# Patient Record
Sex: FEMALE | Race: WHITE | NOT HISPANIC OR LATINO | Employment: FULL TIME | URBAN - NONMETROPOLITAN AREA
[De-identification: names, ages, dates, MRNs, and addresses within clinical notes are randomized per-mention and may not be internally consistent; named-entity substitution may affect disease eponyms.]

---

## 2022-03-25 ENCOUNTER — TRANSFERRED RECORDS (OUTPATIENT)
Dept: HEALTH INFORMATION MANAGEMENT | Facility: HOSPITAL | Age: 62
End: 2022-03-25

## 2022-03-25 LAB
CHOLESTEROL (EXTERNAL): 169 MG/DL (ref 114–200)
HDLC SERPL-MCNC: 52 MG/DL (ref 40–60)
HEP C HIM: NORMAL
HIV 1&2 EXT: NORMAL
HPV ABSTRACT: NORMAL
LDL CHOLESTEROL CALCULATED (EXTERNAL): 101 MG/DL
PAP-ABSTRACT: NORMAL
TRIGLYCERIDES (EXTERNAL): 80 MG/DL (ref 10–200)

## 2022-07-11 ENCOUNTER — TRANSFERRED RECORDS (OUTPATIENT)
Dept: HEALTH INFORMATION MANAGEMENT | Facility: CLINIC | Age: 62
End: 2022-07-11

## 2023-09-13 ENCOUNTER — OFFICE VISIT (OUTPATIENT)
Dept: FAMILY MEDICINE | Facility: OTHER | Age: 63
End: 2023-09-13
Attending: STUDENT IN AN ORGANIZED HEALTH CARE EDUCATION/TRAINING PROGRAM
Payer: COMMERCIAL

## 2023-09-13 VITALS
HEIGHT: 60 IN | OXYGEN SATURATION: 100 % | WEIGHT: 128.5 LBS | SYSTOLIC BLOOD PRESSURE: 110 MMHG | HEART RATE: 80 BPM | TEMPERATURE: 98.2 F | BODY MASS INDEX: 25.23 KG/M2 | DIASTOLIC BLOOD PRESSURE: 66 MMHG

## 2023-09-13 DIAGNOSIS — F33.42 RECURRENT MAJOR DEPRESSIVE DISORDER, IN FULL REMISSION (H): ICD-10-CM

## 2023-09-13 DIAGNOSIS — Z12.31 BREAST CANCER SCREENING BY MAMMOGRAM: ICD-10-CM

## 2023-09-13 DIAGNOSIS — Z76.89 ENCOUNTER TO ESTABLISH CARE: Primary | ICD-10-CM

## 2023-09-13 PROBLEM — N95.2 ATROPHIC VAGINITIS: Status: ACTIVE | Noted: 2022-03-28

## 2023-09-13 PROCEDURE — 99203 OFFICE O/P NEW LOW 30 MIN: CPT | Performed by: STUDENT IN AN ORGANIZED HEALTH CARE EDUCATION/TRAINING PROGRAM

## 2023-09-13 RX ORDER — BUPROPION HYDROCHLORIDE 150 MG/1
150 TABLET ORAL EVERY MORNING
Qty: 90 TABLET | Refills: 3 | Status: SHIPPED | OUTPATIENT
Start: 2023-09-13 | End: 2024-04-02

## 2023-09-13 RX ORDER — BUPROPION HYDROCHLORIDE 150 MG/1
150 TABLET ORAL EVERY MORNING
COMMUNITY
Start: 2023-06-28 | End: 2023-09-13

## 2023-09-13 ASSESSMENT — ANXIETY QUESTIONNAIRES
2. NOT BEING ABLE TO STOP OR CONTROL WORRYING: SEVERAL DAYS
GAD7 TOTAL SCORE: 2
IF YOU CHECKED OFF ANY PROBLEMS ON THIS QUESTIONNAIRE, HOW DIFFICULT HAVE THESE PROBLEMS MADE IT FOR YOU TO DO YOUR WORK, TAKE CARE OF THINGS AT HOME, OR GET ALONG WITH OTHER PEOPLE: NOT DIFFICULT AT ALL
3. WORRYING TOO MUCH ABOUT DIFFERENT THINGS: NOT AT ALL
4. TROUBLE RELAXING: NOT AT ALL
GAD7 TOTAL SCORE: 2
5. BEING SO RESTLESS THAT IT IS HARD TO SIT STILL: NOT AT ALL
6. BECOMING EASILY ANNOYED OR IRRITABLE: NOT AT ALL
7. FEELING AFRAID AS IF SOMETHING AWFUL MIGHT HAPPEN: NOT AT ALL
1. FEELING NERVOUS, ANXIOUS, OR ON EDGE: SEVERAL DAYS

## 2023-09-13 ASSESSMENT — PAIN SCALES - GENERAL: PAINLEVEL: NO PAIN (0)

## 2023-09-13 ASSESSMENT — PATIENT HEALTH QUESTIONNAIRE - PHQ9
SUM OF ALL RESPONSES TO PHQ QUESTIONS 1-9: 5
10. IF YOU CHECKED OFF ANY PROBLEMS, HOW DIFFICULT HAVE THESE PROBLEMS MADE IT FOR YOU TO DO YOUR WORK, TAKE CARE OF THINGS AT HOME, OR GET ALONG WITH OTHER PEOPLE: NOT DIFFICULT AT ALL
SUM OF ALL RESPONSES TO PHQ QUESTIONS 1-9: 5

## 2023-09-13 NOTE — PROGRESS NOTES
Assessment & Plan     Encounter to establish care  Medical, surgical, family, and social histories discussed updated. Reviewed active healthcare problems. Medications reviewed. Reviewed care everywhere records.   Reviewed healthcare maintenance - planning full physical in six weeks.   She will check with insurance on colonoscopy vs cologuard testing.     Recurrent major depressive disorder, in full remission (H)  Stable. Doing well with Wellbutrin. Noted that depression does tend to worsen seasonally. Refilled today.   - buPROPion (WELLBUTRIN XL) 150 MG 24 hr tablet; Take 1 tablet (150 mg) by mouth every morning    Breast cancer screening by mammogram  - MA Screen Bilateral w/Mannie; Future        Kelin Coronado MD  [facility] [place]    Anna Marie Bosch is a 63 year old, presenting for the following health issues:  Depression        9/13/2023     9:17 AM   Additional Questions   Roomed by Tara Harris CMA   Accompanied by self       HPI     Establish care - lives in [place]. Moved from [facility]. She is her moms primary caregiver. Doesn't enjoy the snow. Son in high school in [place]. Works for [employer], works from home.     Has never had colonoscopy. No family history of colon cancer, melena or hematochezia, abdominal pain or change in bowel habits. No prior colon polyps.     Pap neg with neg HPV 3/25/2022  Mammogram nromal 7/11/22  Lipid 3/25/2022    Depression Followup  How are you doing with your depression since your last visit? Improved once changing  jobs, she is in much better shape  Are you having other symptoms that might be associated with depression? No  Have you had a significant life event?  OTHER: stress with being caregiver for her mother and a child that causes stress.     Are you feeling anxious or having panic attacks?   No  Do you have any concerns with your use of alcohol or other drugs? No    Feels job change was very helpful.   Winter is coming, mood  worse seasonally.     Social History     Tobacco Use    Smoking status: Never    Smokeless tobacco: Never         9/13/2023     9:13 AM   PHQ   PHQ-9 Total Score 5   Q9: Thoughts of better off dead/self-harm past 2 weeks Not at all         9/13/2023     9:14 AM   BRISEIDA-7 SCORE   Total Score 2 (minimal anxiety)   Total Score 2         9/13/2023     9:13 AM   Last PHQ-9   1.  Little interest or pleasure in doing things 0   2.  Feeling down, depressed, or hopeless 1   3.  Trouble falling or staying asleep, or sleeping too much 1   4.  Feeling tired or having little energy 1   5.  Poor appetite or overeating 0   6.  Feeling bad about yourself 1   7.  Trouble concentrating 1   8.  Moving slowly or restless 0   Q9: Thoughts of better off dead/self-harm past 2 weeks 0   PHQ-9 Total Score 5         9/13/2023     9:14 AM   BRISEIDA-7    1. Feeling nervous, anxious, or on edge 1   2. Not being able to stop or control worrying 1   3. Worrying too much about different things 0   4. Trouble relaxing 0   5. Being so restless that it is hard to sit still 0   6. Becoming easily annoyed or irritable 0   7. Feeling afraid, as if something awful might happen 0   BRISEIDA-7 Total Score 2   If you checked any problems, how difficult have they made it for you to do your work, take care of things at home, or get along with other people? Not difficult at all           Objective    /66 (BP Location: Right arm, Patient Position: Sitting, Cuff Size: Adult Regular)   Pulse 80   Temp 98.2  F (36.8  C) (Tympanic)   Ht 1.524 m (5')   Wt 58.3 kg (128 lb 8 oz)   SpO2 100%   BMI 25.10 kg/m    Body mass index is 25.1 kg/m .  Physical Exam  Constitutional:       General: She is not in acute distress.     Appearance: Normal appearance.   Cardiovascular:      Rate and Rhythm: Normal rate and regular rhythm.      Heart sounds: No murmur heard.  Pulmonary:      Effort: Pulmonary effort is normal.      Breath sounds: Normal breath sounds. No wheezing,  rhonchi or rales.   Neurological:      General: No focal deficit present.      Mental Status: She is alert and oriented to person, place, and time.   Psychiatric:         Mood and Affect: Mood normal.         Behavior: Behavior normal.

## 2023-09-13 NOTE — PATIENT INSTRUCTIONS
You are due for your mammogram. You can call the [facility] at [phone] to schedule an appointment or you can also schedule through [facility].      Cologuard (stool test) ordered.     For occasional dryness, could consider Replens (over the counter moisturizer)

## 2023-09-21 ENCOUNTER — HOSPITAL ENCOUNTER (OUTPATIENT)
Dept: MAMMOGRAPHY | Facility: OTHER | Age: 63
Discharge: HOME | End: 2023-09-21
Attending: STUDENT IN AN ORGANIZED HEALTH CARE EDUCATION/TRAINING PROGRAM
Payer: COMMERCIAL

## 2023-09-21 ENCOUNTER — ANCILLARY PROCEDURE (OUTPATIENT)
Dept: MAMMOGRAPHY | Facility: OTHER | Age: 63
End: 2023-09-21
Attending: STUDENT IN AN ORGANIZED HEALTH CARE EDUCATION/TRAINING PROGRAM
Payer: COMMERCIAL

## 2023-09-21 DIAGNOSIS — R92.8 ABNORMAL SCREENING MAMMOGRAM: ICD-10-CM

## 2023-09-21 DIAGNOSIS — Z12.31 BREAST CANCER SCREENING BY MAMMOGRAM: ICD-10-CM

## 2023-09-21 PROCEDURE — 77065 DX MAMMO INCL CAD UNI: CPT | Mod: TC | Performed by: RADIOLOGY

## 2023-09-21 PROCEDURE — 77067 SCR MAMMO BI INCL CAD: CPT | Mod: TC | Performed by: RADIOLOGY

## 2023-09-21 PROCEDURE — 77063 BREAST TOMOSYNTHESIS BI: CPT | Mod: TC | Performed by: RADIOLOGY

## 2023-09-21 PROCEDURE — G0279 TOMOSYNTHESIS, MAMMO: HCPCS | Mod: TC | Performed by: RADIOLOGY

## 2023-10-22 ENCOUNTER — HEALTH MAINTENANCE LETTER (OUTPATIENT)
Age: 63
End: 2023-10-22

## 2023-10-31 ASSESSMENT — ENCOUNTER SYMPTOMS
MYALGIAS: 0
HEMATOCHEZIA: 0
NERVOUS/ANXIOUS: 0
HEARTBURN: 0
EYE PAIN: 0
NAUSEA: 0
ABDOMINAL PAIN: 0
JOINT SWELLING: 0
FEVER: 0
DIZZINESS: 0
DIARRHEA: 0
HEADACHES: 0
WEAKNESS: 0
PALPITATIONS: 0
ARTHRALGIAS: 0
COUGH: 0
SORE THROAT: 0
HEMATURIA: 0
FREQUENCY: 0
DYSURIA: 0
PARESTHESIAS: 0
CHILLS: 0
BREAST MASS: 0
SHORTNESS OF BREATH: 0
CONSTIPATION: 0

## 2023-11-01 ENCOUNTER — OFFICE VISIT (OUTPATIENT)
Dept: FAMILY MEDICINE | Facility: OTHER | Age: 63
End: 2023-11-01
Attending: STUDENT IN AN ORGANIZED HEALTH CARE EDUCATION/TRAINING PROGRAM
Payer: COMMERCIAL

## 2023-11-01 ENCOUNTER — LAB (OUTPATIENT)
Dept: LAB | Facility: OTHER | Age: 63
End: 2023-11-01
Attending: STUDENT IN AN ORGANIZED HEALTH CARE EDUCATION/TRAINING PROGRAM
Payer: COMMERCIAL

## 2023-11-01 VITALS
RESPIRATION RATE: 17 BRPM | OXYGEN SATURATION: 96 % | BODY MASS INDEX: 25.52 KG/M2 | HEART RATE: 78 BPM | DIASTOLIC BLOOD PRESSURE: 60 MMHG | TEMPERATURE: 97 F | SYSTOLIC BLOOD PRESSURE: 108 MMHG | WEIGHT: 130 LBS | HEIGHT: 60 IN

## 2023-11-01 DIAGNOSIS — Z00.00 ROUTINE GENERAL MEDICAL EXAMINATION AT A HEALTH CARE FACILITY: ICD-10-CM

## 2023-11-01 DIAGNOSIS — Z00.00 ROUTINE GENERAL MEDICAL EXAMINATION AT A HEALTH CARE FACILITY: Primary | ICD-10-CM

## 2023-11-01 DIAGNOSIS — Z12.11 COLON CANCER SCREENING: ICD-10-CM

## 2023-11-01 LAB
ALBUMIN SERPL BCG-MCNC: 4 G/DL (ref 3.5–5.2)
ALP SERPL-CCNC: 61 U/L (ref 35–104)
ALT SERPL W P-5'-P-CCNC: 20 U/L (ref 0–50)
ANION GAP SERPL CALCULATED.3IONS-SCNC: 11 MMOL/L (ref 7–15)
AST SERPL W P-5'-P-CCNC: 25 U/L (ref 0–45)
BASOPHILS # BLD AUTO: 0.1 10E3/UL (ref 0–0.2)
BASOPHILS NFR BLD AUTO: 1 %
BILIRUB SERPL-MCNC: 0.4 MG/DL
BUN SERPL-MCNC: 12.7 MG/DL (ref 8–23)
CALCIUM SERPL-MCNC: 9.5 MG/DL (ref 8.8–10.2)
CHLORIDE SERPL-SCNC: 104 MMOL/L (ref 98–107)
CHOLEST SERPL-MCNC: 215 MG/DL
CREAT SERPL-MCNC: 0.7 MG/DL (ref 0.51–0.95)
DEPRECATED HCO3 PLAS-SCNC: 25 MMOL/L (ref 22–29)
EGFRCR SERPLBLD CKD-EPI 2021: >90 ML/MIN/1.73M2
EOSINOPHIL # BLD AUTO: 0.4 10E3/UL (ref 0–0.7)
EOSINOPHIL NFR BLD AUTO: 7 %
ERYTHROCYTE [DISTWIDTH] IN BLOOD BY AUTOMATED COUNT: 13.1 % (ref 10–15)
GLUCOSE SERPL-MCNC: 97 MG/DL (ref 70–99)
HCT VFR BLD AUTO: 40.1 % (ref 35–47)
HDLC SERPL-MCNC: 63 MG/DL
HGB BLD-MCNC: 13.4 G/DL (ref 11.7–15.7)
HOLD SPECIMEN: NORMAL
HOLD SPECIMEN: NORMAL
IMM GRANULOCYTES # BLD: 0 10E3/UL
IMM GRANULOCYTES NFR BLD: 0 %
LDLC SERPL CALC-MCNC: 137 MG/DL
LYMPHOCYTES # BLD AUTO: 2 10E3/UL (ref 0.8–5.3)
LYMPHOCYTES NFR BLD AUTO: 37 %
MCH RBC QN AUTO: 31.1 PG (ref 26.5–33)
MCHC RBC AUTO-ENTMCNC: 33.4 G/DL (ref 31.5–36.5)
MCV RBC AUTO: 93 FL (ref 78–100)
MONOCYTES # BLD AUTO: 0.9 10E3/UL (ref 0–1.3)
MONOCYTES NFR BLD AUTO: 16 %
NEUTROPHILS # BLD AUTO: 2.1 10E3/UL (ref 1.6–8.3)
NEUTROPHILS NFR BLD AUTO: 39 %
NONHDLC SERPL-MCNC: 152 MG/DL
NRBC # BLD AUTO: 0 10E3/UL
NRBC BLD AUTO-RTO: 0 /100
PLATELET # BLD AUTO: 276 10E3/UL (ref 150–450)
POTASSIUM SERPL-SCNC: 3.8 MMOL/L (ref 3.4–5.3)
PROT SERPL-MCNC: 7.1 G/DL (ref 6.4–8.3)
RBC # BLD AUTO: 4.31 10E6/UL (ref 3.8–5.2)
SODIUM SERPL-SCNC: 140 MMOL/L (ref 135–145)
TRIGL SERPL-MCNC: 73 MG/DL
WBC # BLD AUTO: 5.4 10E3/UL (ref 4–11)

## 2023-11-01 PROCEDURE — 85025 COMPLETE CBC W/AUTO DIFF WBC: CPT

## 2023-11-01 PROCEDURE — 90471 IMMUNIZATION ADMIN: CPT | Performed by: STUDENT IN AN ORGANIZED HEALTH CARE EDUCATION/TRAINING PROGRAM

## 2023-11-01 PROCEDURE — 80061 LIPID PANEL: CPT

## 2023-11-01 PROCEDURE — 90686 IIV4 VACC NO PRSV 0.5 ML IM: CPT | Performed by: STUDENT IN AN ORGANIZED HEALTH CARE EDUCATION/TRAINING PROGRAM

## 2023-11-01 PROCEDURE — 80053 COMPREHEN METABOLIC PANEL: CPT

## 2023-11-01 PROCEDURE — 36415 COLL VENOUS BLD VENIPUNCTURE: CPT

## 2023-11-01 PROCEDURE — 99396 PREV VISIT EST AGE 40-64: CPT | Mod: 25 | Performed by: STUDENT IN AN ORGANIZED HEALTH CARE EDUCATION/TRAINING PROGRAM

## 2023-11-01 ASSESSMENT — ENCOUNTER SYMPTOMS
FREQUENCY: 0
PARESTHESIAS: 0
BREAST MASS: 0
HEADACHES: 0
WEAKNESS: 0
JOINT SWELLING: 0
CHILLS: 0
ARTHRALGIAS: 0
HEMATOCHEZIA: 0
HEARTBURN: 0
EYE PAIN: 0
SORE THROAT: 0
DYSURIA: 0
SHORTNESS OF BREATH: 0
DIZZINESS: 0
MYALGIAS: 0
PALPITATIONS: 0
HEMATURIA: 0
COUGH: 0
DIARRHEA: 0
ABDOMINAL PAIN: 0
NAUSEA: 0
CONSTIPATION: 0
FEVER: 0
NERVOUS/ANXIOUS: 0

## 2023-11-01 ASSESSMENT — PAIN SCALES - GENERAL: PAINLEVEL: NO PAIN (0)

## 2023-11-01 NOTE — PROGRESS NOTES
SUBJECTIVE:   CC: Moni is an 63 year old who presents for preventive health visit.       11/1/2023     7:49 AM   Additional Questions   Roomed by AKIKO Chow   Accompanied by self       Healthy Habits:     Getting at least 3 servings of Calcium per day:  Yes    Bi-annual eye exam:  Yes    Dental care twice a year:  Yes    Sleep apnea or symptoms of sleep apnea:  None    Diet:  Carbohydrate counting    Frequency of exercise:  4-5 days/week    Duration of exercise:  30-45 minutes    Taking medications regularly:  Yes    Medication side effects:  None    Additional concerns today:  No      Healthcare Maintenance  - Mammogram: normal 9/21/2023  - PAP: Pap neg with neg HPV 3/25/2022 - no history of abnormal   - Colon cancer screening: cologuard ordered  - DEXA: nonsmoker, history of ankle fracture, mom with osteoporosis. Would like to wait till 65yo.   - Lung cancer screening: n/a  - Lipids: 3/25/2022  - covid and flu shot today  - declines RSV  - declines Tdap    Depression Followup  How are you doing with your depression since your last visit? Improved   Are you having other symptoms that might be associated with depression? No  Have you had a significant life event?  No   Are you feeling anxious or having panic attacks?   No  Do you have any concerns with your use of alcohol or other drugs? No    Social History     Tobacco Use    Smoking status: Never     Passive exposure: Never    Smokeless tobacco: Never   Vaping Use    Vaping Use: Never used         9/13/2023     9:13 AM   PHQ   PHQ-9 Total Score 5   Q9: Thoughts of better off dead/self-harm past 2 weeks Not at all         9/13/2023     9:14 AM   BRISEIDA-7 SCORE   Total Score 2 (minimal anxiety)   Total Score 2         9/13/2023     9:13 AM   Last PHQ-9   1.  Little interest or pleasure in doing things 0   2.  Feeling down, depressed, or hopeless 1   3.  Trouble falling or staying asleep, or sleeping too much 1   4.  Feeling tired or having little energy 1   5.   Poor appetite or overeating 0   6.  Feeling bad about yourself 1   7.  Trouble concentrating 1   8.  Moving slowly or restless 0   Q9: Thoughts of better off dead/self-harm past 2 weeks 0   PHQ-9 Total Score 5         9/13/2023     9:14 AM   BRISEIDA-7    1. Feeling nervous, anxious, or on edge 1   2. Not being able to stop or control worrying 1   3. Worrying too much about different things 0   4. Trouble relaxing 0   5. Being so restless that it is hard to sit still 0   6. Becoming easily annoyed or irritable 0   7. Feeling afraid, as if something awful might happen 0   BRISEIDA-7 Total Score 2   If you checked any problems, how difficult have they made it for you to do your work, take care of things at home, or get along with other people? Not difficult at all   6}  Have you ever done Advance Care Planning? (For example, a Health Directive, POLST, or a discussion with a medical provider or your loved ones about your wishes): No, advance care planning information given to patient to review.  Patient declined advance care planning discussion at this time.    Social History     Tobacco Use    Smoking status: Never     Passive exposure: Never    Smokeless tobacco: Never   Substance Use Topics    Alcohol use: Not on file           10/31/2023    10:32 AM   Alcohol Use   Prescreen: >3 drinks/day or >7 drinks/week? No     Reviewed orders with patient.  Reviewed health maintenance and updated orders accordingly - Yes      Breast Cancer Screening:        10/31/2023    10:33 AM   Breast CA Risk Assessment (FHS-7)   Do you have a family history of breast, colon, or ovarian cancer? No / Unknown       Pertinent mammograms are reviewed under the imaging tab.    History of abnormal Pap smear: NO - age 30-65 PAP every 5 years with negative HPV co-testing recommended     Reviewed and updated as needed this visit by clinical staff   Tobacco  Allergies  Meds  Problems  Med Hx  Surg Hx  Fam Hx          Reviewed and updated as needed this  visit by Provider   Tobacco  Allergies  Meds  Problems  Med Hx  Surg Hx  Fam Hx             Review of Systems   Constitutional:  Negative for chills and fever.   HENT:  Negative for congestion, ear pain, hearing loss and sore throat.    Eyes:  Negative for pain and visual disturbance.   Respiratory:  Negative for cough and shortness of breath.    Cardiovascular:  Negative for chest pain, palpitations and peripheral edema.   Gastrointestinal:  Negative for abdominal pain, constipation, diarrhea, heartburn, hematochezia and nausea.   Breasts:  Negative for tenderness, breast mass and discharge.   Genitourinary:  Negative for dysuria, frequency, genital sores, hematuria, pelvic pain, urgency, vaginal bleeding and vaginal discharge.   Musculoskeletal:  Negative for arthralgias, joint swelling and myalgias.   Skin:  Negative for rash.   Neurological:  Negative for dizziness, weakness, headaches and paresthesias.   Psychiatric/Behavioral:  Negative for mood changes. The patient is not nervous/anxious.         OBJECTIVE:   /60 (BP Location: Right arm, Patient Position: Sitting, Cuff Size: Adult Regular)   Pulse 78   Temp 97  F (36.1  C) (Tympanic)   Resp 17   Ht 1.524 m (5')   Wt 59 kg (130 lb)   SpO2 96%   BMI 25.39 kg/m    Physical Exam  Constitutional:       General: She is not in acute distress.     Appearance: Normal appearance. She is well-developed. She is not ill-appearing.   HENT:      Head: Normocephalic and atraumatic.      Right Ear: Tympanic membrane and external ear normal.      Left Ear: Tympanic membrane and external ear normal.      Nose: Nose normal.      Mouth/Throat:      Mouth: Mucous membranes are moist.      Pharynx: No oropharyngeal exudate.   Eyes:      Extraocular Movements: Extraocular movements intact.      Conjunctiva/sclera: Conjunctivae normal.   Neck:      Thyroid: No thyroid mass, thyromegaly or thyroid tenderness.   Cardiovascular:      Rate and Rhythm: Normal rate and  regular rhythm.      Pulses: Normal pulses.      Heart sounds: Normal heart sounds, S1 normal and S2 normal. No murmur heard.  Pulmonary:      Effort: Pulmonary effort is normal. No respiratory distress.      Breath sounds: Normal breath sounds. No wheezing, rhonchi or rales.   Chest:   Breasts:     Right: Normal. No inverted nipple or mass.      Left: Normal. No inverted nipple or mass.   Abdominal:      General: Bowel sounds are normal. There is no abdominal bruit.      Palpations: Abdomen is soft. There is no mass or pulsatile mass.      Tenderness: There is no abdominal tenderness.   Musculoskeletal:         General: Normal range of motion.      Cervical back: Neck supple.      Right lower leg: No edema.      Left lower leg: No edema.   Lymphadenopathy:      Cervical: No cervical adenopathy.      Upper Body:      Right upper body: No supraclavicular or axillary adenopathy.      Left upper body: No supraclavicular or axillary adenopathy.   Skin:     General: Skin is warm and dry.      Capillary Refill: Capillary refill takes less than 2 seconds.      Findings: No rash.   Neurological:      Mental Status: She is alert and oriented to person, place, and time.      Gait: Gait is intact.   Psychiatric:         Attention and Perception: Attention normal.         Mood and Affect: Mood normal.         Speech: Speech normal.         Behavior: Behavior normal.         Thought Content: Thought content normal.         Cognition and Memory: Cognition normal.         Judgment: Judgment normal.         Results for orders placed or performed in visit on 11/01/23   Extra Tube     Status: None (In process)    Narrative    The following orders were created for panel order Extra Tube.  Procedure                               Abnormality         Status                     ---------                               -----------         ------                     Extra Green Top (Lithium...[[id number]]                      In process                  Extra Purple Top Tube[[id number]]                            In process                   Please view results for these tests on the individual orders.   Comprehensive metabolic panel     Status: Normal   Result Value Ref Range    Sodium 140 135 - 145 mmol/L    Potassium 3.8 3.4 - 5.3 mmol/L    Carbon Dioxide (CO2) 25 22 - 29 mmol/L    Anion Gap 11 7 - 15 mmol/L    Urea Nitrogen 12.7 8.0 - 23.0 mg/dL    Creatinine 0.70 0.51 - 0.95 mg/dL    GFR Estimate >90 >60 mL/min/1.73m2    Calcium 9.5 8.8 - 10.2 mg/dL    Chloride 104 98 - 107 mmol/L    Glucose 97 70 - 99 mg/dL    Alkaline Phosphatase 61 35 - 104 U/L    AST 25 0 - 45 U/L    ALT 20 0 - 50 U/L    Protein Total 7.1 6.4 - 8.3 g/dL    Albumin 4.0 3.5 - 5.2 g/dL    Bilirubin Total 0.4 <=1.2 mg/dL   Lipid Profile     Status: Abnormal   Result Value Ref Range    Cholesterol 215 (H) <200 mg/dL    Triglycerides 73 <150 mg/dL    Direct Measure HDL 63 >=50 mg/dL    LDL Cholesterol Calculated 137 (H) <=100 mg/dL    Non HDL Cholesterol 152 (H) <130 mg/dL    Narrative    Cholesterol  Desirable:  <200 mg/dL    Triglycerides  Normal:  Less than 150 mg/dL  Borderline High:  150-199 mg/dL  High:  200-499 mg/dL  Very High:  Greater than or equal to 500 mg/dL    Direct Measure HDL  Female:  Greater than or equal to 50 mg/dL   Male:  Greater than or equal to 40 mg/dL    LDL Cholesterol  Desirable:  <100mg/dL  Above Desirable:  100-129 mg/dL   Borderline High:  130-159 mg/dL   High:  160-189 mg/dL   Very High:  >= 190 mg/dL    Non HDL Cholesterol  Desirable:  130 mg/dL  Above Desirable:  130-159 mg/dL  Borderline High:  160-189 mg/dL  High:  190-219 mg/dL  Very High:  Greater than or equal to 220 mg/dL   CBC with platelets and differential     Status: None   Result Value Ref Range    WBC Count 5.4 4.0 - 11.0 10e3/uL    RBC Count 4.31 3.80 - 5.20 10e6/uL    Hemoglobin 13.4 11.7 - 15.7 g/dL    Hematocrit 40.1 35.0 - 47.0 %    MCV 93 78 - 100 fL    MCH 31.1 26.5 - 33.0 pg    MCHC  33.4 31.5 - 36.5 g/dL    RDW 13.1 10.0 - 15.0 %    Platelet Count 276 150 - 450 10e3/uL    % Neutrophils 39 %    % Lymphocytes 37 %    % Monocytes 16 %    % Eosinophils 7 %    % Basophils 1 %    % Immature Granulocytes 0 %    NRBCs per 100 WBC 0 <1 /100    Absolute Neutrophils 2.1 1.6 - 8.3 10e3/uL    Absolute Lymphocytes 2.0 0.8 - 5.3 10e3/uL    Absolute Monocytes 0.9 0.0 - 1.3 10e3/uL    Absolute Eosinophils 0.4 0.0 - 0.7 10e3/uL    Absolute Basophils 0.1 0.0 - 0.2 10e3/uL    Absolute Immature Granulocytes 0.0 <=0.4 10e3/uL    Absolute NRBCs 0.0 10e3/uL   CBC with Platelets & Differential     Status: None    Narrative    The following orders were created for panel order CBC with Platelets & Differential.  Procedure                               Abnormality         Status                     ---------                               -----------         ------                     CBC with platelets and d...[[id number]]                      Final result                 Please view results for these tests on the individual orders.         ASSESSMENT/PLAN:   Routine general medical examination at a health care facility  Healthcare Maintenance  - Mammogram: normal 9/21/2023  - PAP: Pap neg with neg HPV 3/25/2022 - no history of abnormal   - Colon cancer screening: cologuard ordered  - DEXA: nonsmoker, history of ankle fracture, mom with osteoporosis. Would like to wait till 67yo.   - Lung cancer screening: n/a  - Lipids: 3/25/2022  - covid and flu shot today  - declines RSV  - declines Tdap    - CBC with Platelets & Differential; Future  - Comprehensive metabolic panel; Future  - Lipid Profile; Future  - INFLUENZA VACCINE >6 MONTHS (AFLURIA/FLUZONE)    Colon cancer screening  Based on patient's low risk status (negative family history, no symptoms, and no prior high risk polyps) discussed benefits and risks of screening options including cologuard testing and colonoscopy. With shared decision making, patient elected to  pursue cologuard. Order placed. Reviewed if positive, it would still be considered a screening colonoscopy.   - COLOGUAELANA([employer])      Patient has been advised of split billing requirements and indicates understanding: No      COUNSELING:  Reviewed preventive health counseling, as reflected in patient instructions       Regular exercise       Healthy diet/nutrition       Osteoporosis prevention/bone health      BMI:   Estimated body mass index is 25.39 kg/m  as calculated from the following:    Height as of this encounter: 1.524 m (5').    Weight as of this encounter: 59 kg (130 lb).   Weight management plan: Discussed healthy diet and exercise guidelines      She reports that she has never smoked. She has never been exposed to tobacco smoke. She has never used smokeless tobacco.        Kelin Coronado MD  [facility] [place]

## 2023-11-10 ENCOUNTER — ALLIED HEALTH/NURSE VISIT (OUTPATIENT)
Dept: FAMILY MEDICINE | Facility: OTHER | Age: 63
End: 2023-11-10
Attending: STUDENT IN AN ORGANIZED HEALTH CARE EDUCATION/TRAINING PROGRAM
Payer: COMMERCIAL

## 2023-11-10 DIAGNOSIS — Z23 HIGH PRIORITY FOR 2019-NCOV VACCINE: Primary | ICD-10-CM

## 2023-11-10 PROCEDURE — 91320 SARSCV2 VAC 30MCG TRS-SUC IM: CPT

## 2023-11-10 PROCEDURE — 90480 ADMN SARSCOV2 VAC 1/ONLY CMP: CPT

## 2023-12-06 LAB — NONINV COLON CA DNA+OCC BLD SCRN STL QL: NEGATIVE

## 2024-03-13 DIAGNOSIS — F33.42 RECURRENT MAJOR DEPRESSIVE DISORDER, IN FULL REMISSION (H): ICD-10-CM

## 2024-03-13 RX ORDER — BUPROPION HYDROCHLORIDE 150 MG/1
150 TABLET ORAL EVERY MORNING
Qty: 90 TABLET | Refills: 0 | OUTPATIENT
Start: 2024-03-13

## 2024-04-02 DIAGNOSIS — F33.42 RECURRENT MAJOR DEPRESSIVE DISORDER, IN FULL REMISSION (H): ICD-10-CM

## 2024-04-02 NOTE — TELEPHONE ENCOUNTER
Wellbutrin      Last Written Prescription Date:  09/13/23  Last Fill Quantity: 90,   # refills: 3  Last Office Visit: 11/1/23  Future Office visit:

## 2024-04-03 RX ORDER — BUPROPION HYDROCHLORIDE 150 MG/1
150 TABLET ORAL EVERY MORNING
Qty: 90 TABLET | Refills: 3 | Status: SHIPPED | OUTPATIENT
Start: 2024-04-03

## 2024-04-19 ENCOUNTER — ANCILLARY PROCEDURE (OUTPATIENT)
Dept: GENERAL RADIOLOGY | Facility: OTHER | Age: 64
End: 2024-04-19
Attending: STUDENT IN AN ORGANIZED HEALTH CARE EDUCATION/TRAINING PROGRAM
Payer: COMMERCIAL

## 2024-04-19 ENCOUNTER — OFFICE VISIT (OUTPATIENT)
Dept: FAMILY MEDICINE | Facility: OTHER | Age: 64
End: 2024-04-19
Attending: STUDENT IN AN ORGANIZED HEALTH CARE EDUCATION/TRAINING PROGRAM
Payer: COMMERCIAL

## 2024-04-19 ENCOUNTER — TELEPHONE (OUTPATIENT)
Dept: FAMILY MEDICINE | Facility: OTHER | Age: 64
End: 2024-04-19

## 2024-04-19 VITALS
HEART RATE: 84 BPM | DIASTOLIC BLOOD PRESSURE: 58 MMHG | RESPIRATION RATE: 16 BRPM | SYSTOLIC BLOOD PRESSURE: 106 MMHG | BODY MASS INDEX: 23.87 KG/M2 | TEMPERATURE: 97.2 F | HEIGHT: 59 IN | OXYGEN SATURATION: 99 % | WEIGHT: 118.4 LBS

## 2024-04-19 DIAGNOSIS — M62.830 BACK MUSCLE SPASM: ICD-10-CM

## 2024-04-19 DIAGNOSIS — M54.50 ACUTE RIGHT-SIDED LOW BACK PAIN WITHOUT SCIATICA: ICD-10-CM

## 2024-04-19 DIAGNOSIS — M53.3 SACROILIAC JOINT PAIN: ICD-10-CM

## 2024-04-19 DIAGNOSIS — M54.50 ACUTE RIGHT-SIDED LOW BACK PAIN WITHOUT SCIATICA: Primary | ICD-10-CM

## 2024-04-19 LAB
ALBUMIN UR-MCNC: NEGATIVE MG/DL
APPEARANCE UR: ABNORMAL
BACTERIA #/AREA URNS HPF: ABNORMAL /HPF
BILIRUB UR QL STRIP: NEGATIVE
COLOR UR AUTO: YELLOW
GLUCOSE UR STRIP-MCNC: NEGATIVE MG/DL
HGB UR QL STRIP: NEGATIVE
KETONES UR STRIP-MCNC: 20 MG/DL
LEUKOCYTE ESTERASE UR QL STRIP: NEGATIVE
MUCOUS THREADS #/AREA URNS LPF: PRESENT /LPF
NITRATE UR QL: NEGATIVE
PH UR STRIP: 7 [PH] (ref 4.7–8)
RBC URINE: <1 /HPF
SP GR UR STRIP: 1.02 (ref 1–1.03)
SQUAMOUS EPITHELIAL: 0 /HPF
UROBILINOGEN UR STRIP-MCNC: NORMAL MG/DL
WBC URINE: 2 /HPF

## 2024-04-19 PROCEDURE — 81001 URINALYSIS AUTO W/SCOPE: CPT | Performed by: STUDENT IN AN ORGANIZED HEALTH CARE EDUCATION/TRAINING PROGRAM

## 2024-04-19 PROCEDURE — 72100 X-RAY EXAM L-S SPINE 2/3 VWS: CPT | Mod: TC | Performed by: RADIOLOGY

## 2024-04-19 PROCEDURE — 99213 OFFICE O/P EST LOW 20 MIN: CPT | Performed by: STUDENT IN AN ORGANIZED HEALTH CARE EDUCATION/TRAINING PROGRAM

## 2024-04-19 RX ORDER — IBUPROFEN 800 MG/1
800 TABLET, FILM COATED ORAL EVERY 6 HOURS PRN
COMMUNITY
Start: 2024-03-08 | End: 2024-04-23

## 2024-04-19 RX ORDER — CYCLOBENZAPRINE HCL 5 MG
5-10 TABLET ORAL 2 TIMES DAILY PRN
Qty: 20 TABLET | Refills: 0 | Status: SHIPPED | OUTPATIENT
Start: 2024-04-19 | End: 2024-06-21

## 2024-04-19 RX ORDER — OXYCODONE HYDROCHLORIDE 5 MG/1
5 TABLET ORAL EVERY 6 HOURS PRN
COMMUNITY
Start: 2024-03-08 | End: 2024-04-23

## 2024-04-19 RX ORDER — NAPROXEN 500 MG/1
500 TABLET ORAL 2 TIMES DAILY WITH MEALS
Qty: 10 TABLET | Refills: 0 | Status: SHIPPED | OUTPATIENT
Start: 2024-04-19 | End: 2024-04-24

## 2024-04-19 ASSESSMENT — ENCOUNTER SYMPTOMS: BACK PAIN: 1

## 2024-04-19 ASSESSMENT — PATIENT HEALTH QUESTIONNAIRE - PHQ9
10. IF YOU CHECKED OFF ANY PROBLEMS, HOW DIFFICULT HAVE THESE PROBLEMS MADE IT FOR YOU TO DO YOUR WORK, TAKE CARE OF THINGS AT HOME, OR GET ALONG WITH OTHER PEOPLE: SOMEWHAT DIFFICULT
SUM OF ALL RESPONSES TO PHQ QUESTIONS 1-9: 6
SUM OF ALL RESPONSES TO PHQ QUESTIONS 1-9: 6

## 2024-04-19 ASSESSMENT — PAIN SCALES - GENERAL: PAINLEVEL: SEVERE PAIN (6)

## 2024-04-19 NOTE — TELEPHONE ENCOUNTER
8:42 AM    Reason for Call: OVERBOOK    Patient is having the following symptoms: Having back spasms and can't lay down and she can't sit and hurting a lot .    The patient is requesting an appointment for today with Dr Coronado.    Was an appointment offered for this call? No  If yes : Appointment type              Date    Preferred method for responding to this message: Telephone Call  What is your phone number ? [phone]    If we cannot reach you directly, may we leave a detailed response at the number you provided? Yes    Can this message wait until your PCP/provider returns, if unavailable today? No

## 2024-04-19 NOTE — PROGRESS NOTES
Assessment & Plan     Acute right-sided low back pain without sciatica  Back muscle spasm  Sacroiliac joint pain  Suspect muscle spasm related to mechanics and change in gait after rolling her ankle over Easter.  May also have some right SI joint arthritis, causing radiating pain and spasm into her right low back.  No red flag symptoms such as unrelenting night pain, loss of bowel or bladder, or fevers. Has had good response/relief to eat and massage tactics at home.   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  Discussed option of obtaining additional x-rays of her pelvis, she would like to hold off today.  Plan to trial naproxen 500 mg twice daily for 5 days.  Stop other ibuprofen.  Will give Flexeril 5 to 10 mg to use as needed for muscle spasm.  Reviewed sedation and possible side effects.  Urgent referral to the chiropractor.  Hopefully they can fit her in on Monday.  UA was obtained today to ensure no kidney abnormality and urine was free of infection.  Follow-up in 5 days.  If symptoms persist without improvement, would obtain some lab work to rule out any other etiology, additional imaging of the pelvis/SI joint, and possible physical therapy and steroid injection of the SI joint.  - XR LUMBAR SPINE 2/3 VIEWS (Clinic Performed); Future  - UA with Microscopic reflex to Culture - [place]; Future  - UA with Microscopic reflex to Culture - [place]  - Chiropractic Referral  - naproxen (NAPROSYN) 500 MG tablet; Take 1 tablet (500 mg) by mouth 2 times daily (with meals) for 5 days Do not take any ibuprofen while taking this.  - cyclobenzaprine (FLEXERIL) 5 MG tablet; Take 1-2 tablets (5-10 mg) by mouth 2 times daily as needed for muscle spasms        Anna Marie Montenegro is a 63 year old, presenting for the following health issues:  Back Pain        4/19/2024    11:27 AM   Additional Questions   Roomed by April   Accompanied by self         4/19/2024     11:27 AM   Patient Reported Additional Medications   Patient reports taking the following new medications none     History of Present Illness       Back Pain:  She presents for follow up of back pain. Patient's back pain is a new problem.    Original cause of back pain: not sure  First noticed back pain: 1-4 weeks ago  Patient feels back pain: comes and goesLocation of back pain:  Right middle of back, right upper back and right side of waist  Description of back pain: cramping, dull ache and sharp  Back pain spreads: nowhere    Since patient first noticed back pain, pain is: gradually worsening  Does back pain interfere with her job:  Yes  On a scale of 1-10 (10 being the worst), patient describes pain as:  10  What makes back pain worse: certain positions, sitting, standing and twisting   Acupuncture: not tried  Acetaminophen: not helpful  Activity or exercise: not helpful  Chiropractor:  Not tried  Cold: not helpful  Heat: helpful  Massage: not helpful  Muscle relaxants: not tried  NSAIDS: not helpful  Opioids: helpful  Physical Therapy: not tried  Rest: helpful  Steroid Injection: not tried  Stretching: not helpful  Surgery: not tried  TENS unit: not tried  Topical pain relievers: not tried  Other healthcare providers patient is seeing for back pain: None    She eats 2-3 servings of fruits and vegetables daily.She consumes 0 sweetened beverage(s) daily.She exercises with enough effort to increase her heart rate 10 to 19 minutes per day.  She exercises with enough effort to increase her heart rate 5 days per week.   She is taking medications regularly.     Started Easter Sunday. No injury. Did roll her ankle that afternoon it started though. Was limping afterwards. Noticed back pain that night.   Right sided, sore to the touch. Can radiate into her arm.   Has tried stretching, foam roller. Helpful while she's doing it.   Ibuprofen or tylenol NOT helping. Yoga bothers it.   Spasms are horrible and make her feel  "like she's going to throw up.   Tried oxycodone to finish out day - did help   No pain down legs. Unsure if legs are weak.   Only bothers her if she moves. Can only lay flat on back with heat.   Has been working hard on weight loss this winter.   No fever.   Worse with moving around.   Ibuprofen 800mg every 6 hours for two days - did this a few days ago (Wednesday last day)  No dysuria or frequency  No unrelenting night pain  No respiratory symptoms.   Pressure on her back is amazing - very helpful.     Pain History:  When did you first notice your pain? 3 weeks   Have you seen anyone else for your pain? No  How has your pain affected your ability to work? Working full time but in pain  Where in your body do you have pain? Back Pain  Onset/Duration: 3 weeks   Description:   Location of pain: middle of back right  Character of pain: sharp, dull ache, stabbing, gnawing, burning, cramping, and intermittent  Pain radiation:  spine, and to ribs  New numbness or weakness in legs, not attributed to pain: no   Intensity: Currently 6/10  Progression of Symptoms: worsening  History:   Specific cause: none  Pain interferes with job: YES  History of back problems: no prior back problems  Any previous MRI or X-rays: None  Sees a specialist for back pain: No  Alleviating factors:   Improved by: heat and rest    Precipitating factors:  Worsened by: Standing and Sitting  Therapies tried and outcome: acetaminophen (Tylenol), heat, opioids, rest, sitting, standing, and stretch    Accompanying Signs & Symptoms:  Risk of Fracture: None  Risk of Cauda Equina: None  Risk of Infection: None  Risk of Cancer: None  Risk of Ankylosing Spondylitis: Onset at age <35, male, AND morning back stiffness  no           Objective    /58 (BP Location: Right arm, Patient Position: Sitting, Cuff Size: Adult Regular)   Pulse 84   Temp 97.2  F (36.2  C) (Tympanic)   Resp 16   Ht 1.499 m (4' 11\")   Wt 53.7 kg (118 lb 6.4 oz)   SpO2 99%   BMI " 23.91 kg/m    Body mass index is 23.91 kg/m .    Physical Exam  Constitutional:       General: She is not in acute distress.     Appearance: Normal appearance. She is not ill-appearing.   Pulmonary:      Effort: Pulmonary effort is normal.      Breath sounds: Normal breath sounds.   Abdominal:      Palpations: Abdomen is soft.   Musculoskeletal:         General: No deformity.        Arms:       Right lower leg: No edema.      Left lower leg: No edema.      Comments: No tenderness over her cervical, thoracic, or lumbar spine.  No tenderness over left SI joint.  She is tender over her right SI joint.  She has significant paraspinal muscular spasm on the right side from the low back to mid back.  This is not present on the left.  There is no swelling or warmth of the area.  She has 5/5 strength with hip flexion, leg extension, leg flexion, dorsiflexion and plantarflexion.  Patellar reflexes are 2+ bilaterally with no clonus.  Straight leg raise is negative bilaterally.  JOSEFINA testing is negative.  She does have pain with increased pressure on the right SI joint/stretching of the muscles there.   Skin:     General: Skin is warm and dry.      Capillary Refill: Capillary refill takes less than 2 seconds.      Findings: No erythema, lesion or rash.      Comments: Some scratches on her right low back, no erythema or swelling.   Neurological:      General: No focal deficit present.      Mental Status: She is alert and oriented to person, place, and time.      Motor: No weakness.      Gait: Gait normal.      Deep Tendon Reflexes: Reflexes normal.   Psychiatric:         Mood and Affect: Mood normal.         Behavior: Behavior normal.            Results for orders placed or performed in visit on 04/19/24 (from the past 24 hour(s))   UA with Microscopic reflex to Culture - [place]    Specimen: Urine, Clean Catch   Result Value Ref Range    Color Urine Yellow Colorless, Straw, Light Yellow, Yellow    Appearance Urine Slightly  Cloudy (A) Clear    Glucose Urine Negative Negative mg/dL    Bilirubin Urine Negative Negative    Ketones Urine 20 (A) Negative mg/dL    Specific Gravity Urine 1.016 1.003 - 1.035    Blood Urine Negative Negative    pH Urine 7.0 4.7 - 8.0    Protein Albumin Urine Negative Negative mg/dL    Urobilinogen Urine Normal Normal, 2.0 mg/dL    Nitrite Urine Negative Negative    Leukocyte Esterase Urine Negative Negative    Bacteria Urine Few (A) None Seen /HPF    Mucus Urine Present (A) None Seen /LPF    RBC Urine <1 <=2 /HPF    WBC Urine 2 <=5 /HPF    Squamous Epithelials Urine 0 <=1 /HPF    Narrative    Urine Culture not indicated           Signed Electronically by: Kelin Coronado MD

## 2024-04-19 NOTE — TELEPHONE ENCOUNTER
Writer called and spoke with patient. Patient is scheduled for 4/19/2024 at 1130. Patient had no further questions.

## 2024-04-23 ENCOUNTER — ANCILLARY PROCEDURE (OUTPATIENT)
Dept: GENERAL RADIOLOGY | Facility: OTHER | Age: 64
End: 2024-04-23
Attending: STUDENT IN AN ORGANIZED HEALTH CARE EDUCATION/TRAINING PROGRAM
Payer: COMMERCIAL

## 2024-04-23 ENCOUNTER — OFFICE VISIT (OUTPATIENT)
Dept: FAMILY MEDICINE | Facility: OTHER | Age: 64
End: 2024-04-23
Attending: STUDENT IN AN ORGANIZED HEALTH CARE EDUCATION/TRAINING PROGRAM
Payer: COMMERCIAL

## 2024-04-23 VITALS
DIASTOLIC BLOOD PRESSURE: 62 MMHG | BODY MASS INDEX: 24.35 KG/M2 | HEART RATE: 77 BPM | HEIGHT: 59 IN | WEIGHT: 120.8 LBS | OXYGEN SATURATION: 99 % | TEMPERATURE: 97.8 F | SYSTOLIC BLOOD PRESSURE: 104 MMHG | RESPIRATION RATE: 17 BRPM

## 2024-04-23 DIAGNOSIS — M62.830 BACK MUSCLE SPASM: ICD-10-CM

## 2024-04-23 DIAGNOSIS — M54.50 ACUTE RIGHT-SIDED LOW BACK PAIN WITHOUT SCIATICA: ICD-10-CM

## 2024-04-23 DIAGNOSIS — M53.3 SACROILIAC JOINT PAIN: ICD-10-CM

## 2024-04-23 DIAGNOSIS — M54.50 ACUTE RIGHT-SIDED LOW BACK PAIN WITHOUT SCIATICA: Primary | ICD-10-CM

## 2024-04-23 LAB
ALBUMIN SERPL BCG-MCNC: 4.2 G/DL (ref 3.5–5.2)
ALP SERPL-CCNC: 52 U/L (ref 40–150)
ALT SERPL W P-5'-P-CCNC: 15 U/L (ref 0–50)
ANION GAP SERPL CALCULATED.3IONS-SCNC: 9 MMOL/L (ref 7–15)
AST SERPL W P-5'-P-CCNC: 25 U/L (ref 0–45)
BASOPHILS # BLD AUTO: 0.1 10E3/UL (ref 0–0.2)
BASOPHILS NFR BLD AUTO: 1 %
BILIRUB SERPL-MCNC: 0.4 MG/DL
BUN SERPL-MCNC: 18.7 MG/DL (ref 8–23)
CALCIUM SERPL-MCNC: 9.5 MG/DL (ref 8.8–10.2)
CHLORIDE SERPL-SCNC: 103 MMOL/L (ref 98–107)
CREAT SERPL-MCNC: 0.8 MG/DL (ref 0.51–0.95)
CRP SERPL-MCNC: <3 MG/L
DEPRECATED HCO3 PLAS-SCNC: 24 MMOL/L (ref 22–29)
EGFRCR SERPLBLD CKD-EPI 2021: 83 ML/MIN/1.73M2
EOSINOPHIL # BLD AUTO: 0.1 10E3/UL (ref 0–0.7)
EOSINOPHIL NFR BLD AUTO: 1 %
ERYTHROCYTE [DISTWIDTH] IN BLOOD BY AUTOMATED COUNT: 13 % (ref 10–15)
GLUCOSE SERPL-MCNC: 87 MG/DL (ref 70–99)
HCT VFR BLD AUTO: 41.4 % (ref 35–47)
HGB BLD-MCNC: 14 G/DL (ref 11.7–15.7)
IMM GRANULOCYTES # BLD: 0 10E3/UL
IMM GRANULOCYTES NFR BLD: 0 %
LYMPHOCYTES # BLD AUTO: 2.4 10E3/UL (ref 0.8–5.3)
LYMPHOCYTES NFR BLD AUTO: 46 %
MCH RBC QN AUTO: 30.9 PG (ref 26.5–33)
MCHC RBC AUTO-ENTMCNC: 33.8 G/DL (ref 31.5–36.5)
MCV RBC AUTO: 91 FL (ref 78–100)
MONOCYTES # BLD AUTO: 0.6 10E3/UL (ref 0–1.3)
MONOCYTES NFR BLD AUTO: 11 %
NEUTROPHILS # BLD AUTO: 2.1 10E3/UL (ref 1.6–8.3)
NEUTROPHILS NFR BLD AUTO: 40 %
NRBC # BLD AUTO: 0 10E3/UL
NRBC BLD AUTO-RTO: 0 /100
PLATELET # BLD AUTO: 271 10E3/UL (ref 150–450)
POTASSIUM SERPL-SCNC: 4.3 MMOL/L (ref 3.4–5.3)
PROT SERPL-MCNC: 7.6 G/DL (ref 6.4–8.3)
RBC # BLD AUTO: 4.53 10E6/UL (ref 3.8–5.2)
SODIUM SERPL-SCNC: 136 MMOL/L (ref 135–145)
WBC # BLD AUTO: 5.3 10E3/UL (ref 4–11)

## 2024-04-23 PROCEDURE — 36415 COLL VENOUS BLD VENIPUNCTURE: CPT | Performed by: STUDENT IN AN ORGANIZED HEALTH CARE EDUCATION/TRAINING PROGRAM

## 2024-04-23 PROCEDURE — 96372 THER/PROPH/DIAG INJ SC/IM: CPT | Performed by: STUDENT IN AN ORGANIZED HEALTH CARE EDUCATION/TRAINING PROGRAM

## 2024-04-23 PROCEDURE — 86140 C-REACTIVE PROTEIN: CPT | Performed by: STUDENT IN AN ORGANIZED HEALTH CARE EDUCATION/TRAINING PROGRAM

## 2024-04-23 PROCEDURE — 85025 COMPLETE CBC W/AUTO DIFF WBC: CPT | Performed by: STUDENT IN AN ORGANIZED HEALTH CARE EDUCATION/TRAINING PROGRAM

## 2024-04-23 PROCEDURE — 72202 X-RAY EXAM SI JOINTS 3/> VWS: CPT | Mod: TC | Performed by: RADIOLOGY

## 2024-04-23 PROCEDURE — 99214 OFFICE O/P EST MOD 30 MIN: CPT | Mod: 25 | Performed by: STUDENT IN AN ORGANIZED HEALTH CARE EDUCATION/TRAINING PROGRAM

## 2024-04-23 PROCEDURE — 80053 COMPREHEN METABOLIC PANEL: CPT | Performed by: STUDENT IN AN ORGANIZED HEALTH CARE EDUCATION/TRAINING PROGRAM

## 2024-04-23 RX ORDER — KETOROLAC TROMETHAMINE 30 MG/ML
30 INJECTION, SOLUTION INTRAMUSCULAR; INTRAVENOUS ONCE
Status: COMPLETED | OUTPATIENT
Start: 2024-04-23 | End: 2024-04-23

## 2024-04-23 RX ORDER — PREDNISONE 20 MG/1
40 TABLET ORAL DAILY
Qty: 10 TABLET | Refills: 0 | Status: SHIPPED | OUTPATIENT
Start: 2024-04-23 | End: 2024-04-28

## 2024-04-23 RX ORDER — METHOCARBAMOL 750 MG/1
750-1500 TABLET, FILM COATED ORAL 3 TIMES DAILY PRN
Qty: 30 TABLET | Refills: 0 | Status: SHIPPED | OUTPATIENT
Start: 2024-04-23 | End: 2024-06-21

## 2024-04-23 RX ORDER — HYDROCODONE BITARTRATE AND ACETAMINOPHEN 5; 325 MG/1; MG/1
1 TABLET ORAL EVERY 6 HOURS PRN
Qty: 14 TABLET | Refills: 0 | Status: SHIPPED | OUTPATIENT
Start: 2024-04-23 | End: 2024-04-30

## 2024-04-23 RX ADMIN — KETOROLAC TROMETHAMINE 30 MG: 30 INJECTION, SOLUTION INTRAMUSCULAR; INTRAVENOUS at 09:21

## 2024-04-23 ASSESSMENT — PAIN SCALES - GENERAL: PAINLEVEL: EXTREME PAIN (8)

## 2024-04-23 NOTE — PROGRESS NOTES
Assessment & Plan     Acute right-sided low back pain without sciatica  See full note 4/19/24.   Continued pain with minimal improvement with naproxen, although is getting some relief with the muscle relaxer.  Very difficult to work with her job that requires sitting and standing, feels most comfortable when she is laying down.  Labs reassuring - normal CBC and CRP.   Suspect disc herniation versus impingement of L1-L3 nerve based on her symptoms.  No red flag symptoms at this time.  Will plan to treat with Toradol injection today, prednisone for 5 days, and change muscle relaxer to 1 that is hopefully less sedating so she can take during the day.  Norco given for severe pain.  Discussed risks of sedation, respiratory depression, tolerance, and opioid dependence  Has a chiropractor appointment tomorrow.  Urgent referral placed for physical therapy.  Follow-up in 1 week, if no improvement would get MRI.  Aware of signs or symptoms that indicate need for urgent follow-up sooner or emergency department evaluation.  She will reach out if work note is needed.   -The benefits and risks of prednisone treatment, including increased energy, increased irritability or mood changes, decreased sleep, weight changes, increased glucose and increased appetite were discussed with the patient. The patient stated understanding and agreed to proceed with treatment.  - XR Sacroiliac Joint G/E 3 Views; Future  - CBC with Platelets & Differential; Future  - Comprehensive metabolic panel; Future  - CRP inflammation; Future  - predniSONE (DELTASONE) 20 MG tablet; Take 2 tablets (40 mg) by mouth daily for 5 days Take with food, ideally in the morning.  - methocarbamol (ROBAXIN) 750 MG tablet; Take 1-2 tablets (750-1,500 mg) by mouth 3 times daily as needed for muscle spasms Do not take with flexeril. Try predominantly in AM for work.  - HYDROcodone-acetaminophen (NORCO) 5-325 MG tablet; Take 1 tablet by mouth every 6 hours as needed for  severe pain  - Physical Therapy  Referral; Future  - ketorolac (TORADOL) injection 30 mg    Back muscle spasm  Improving on exam.   - XR Sacroiliac Joint G/E 3 Views; Future  - methocarbamol (ROBAXIN) 750 MG tablet; Take 1-2 tablets (750-1,500 mg) by mouth 3 times daily as needed for muscle spasms Do not take with flexeril. Try predominantly in AM for work.  - Physical Therapy  Referral; Future  - ketorolac (TORADOL) injection 30 mg    Sacroiliac joint pain  Seems to be the issue last week, however today is  there but pain is not provoked with testing so lower suspicion that this is the issue.  Did obtain x-ray of her SI joints but minimal arthritis and read technically normal with radiology.  Will continue to closely monitor.  For her tenderness over that area, I do feel she would benefit from chiropractor treatment so encouraged her to keep her appointment for tomorrow.  - XR Sacroiliac Joint G/E 3 Views; Future  - Physical Therapy  Referral; Future  - ketorolac (TORADOL) injection 30 mg      Anna Marie Montenegro is a 63 year old, presenting for the following health issues:  Back Pain        4/23/2024     8:04 AM   Additional Questions   Roomed by AKIKO Chow   Accompanied by self`     HPI     Pain History:  When did you first notice your pain? 1-4 WEEKS   Have you seen anyone else for your pain? No  How has your pain affected your ability to work? yes  Where in your body do you have pain? Back Pain  Onset/Duration: 1-4 weeks  Description:   Location of pain: low back right  Character of pain: sharp, dull ache, stabbing, gnawing, fullness, and cramping  Pain radiation: radiates into the right buttocks and radiates into the right leg  New numbness or weakness in legs, not attributed to pain: no   Intensity: Currently 8/10  Progression of Symptoms: worsening and same  History:   Specific cause: none  Pain interferes with job: YES  History of back problems: no prior back  "problems  Any previous MRI or X-rays: Yes- at [facility].    Sees a specialist for back pain: No  Alleviating factors:   Improved by: no    Precipitating factors:  Worsened by: Lifting, Bending, Standing, and Walking  Therapies tried and outcome: muscle relaxants and NSAIDs      Sleeping better with muscle relaxer.   Wakes up in morning, feels okay, then starts to move and pain comes back.   Did 5 days of naproxen, minimal change.   Chiro not until tomorrow     Back on right low back and into thigh. Will wrap around to ribs.   Getting strange creepy crawly sensations in right lower leg.   Pain there sitting now. Pain is worse with position changes  In bed laying flat with go away.   Took two muscle relaxers Saturday and had NO pain at all for a few hours. Laid down a lot that day and this was helpful.   Sunday only did one muscle relaxer and tried to move around and it was not great. Laying down always comes it down.   Stabbing pain that turns into spasm. Worse than an ache. Tearful.   Nausea from the pain.   No fevers, loss of bowel or bladder, or saddle anesthesia.        Objective    /62 (BP Location: Left arm, Patient Position: Sitting, Cuff Size: Adult Regular)   Pulse 77   Temp 97.8  F (36.6  C) (Tympanic)   Resp 17   Ht 1.499 m (4' 11\")   Wt 54.8 kg (120 lb 12.8 oz)   SpO2 99%   BMI 24.40 kg/m    Body mass index is 24.4 kg/m .    Physical Exam  Constitutional:       General: She is not in acute distress.     Appearance: Normal appearance.   Pulmonary:      Effort: Pulmonary effort is normal.   Musculoskeletal:      Comments: No rashes or lesions over her back.  No cervical, thoracic, or lumbar tenderness.  Remains tender over the right SI joint but not the left SI joint.  Still slight paraspinal muscle spasm on the right but is significantly improved.  5/5 strength with hip flexion, leg extension, leg flexion, plantarflexion, and dorsiflexion bilaterally.  2+ Achilles reflex bilaterally.  Some " pulling sensation with flexion of her back.  No pain with extension of back.  Negative straight leg raise bilaterally.  JOSEFINA testing negative.  No pain with standing on the right leg in the SI joint.  No pain with testing that provokes the SI joint.   Skin:     General: Skin is warm and dry.      Findings: No lesion or rash.   Neurological:      General: No focal deficit present.      Mental Status: She is alert and oriented to person, place, and time.      Gait: Gait normal.   Psychiatric:         Mood and Affect: Affect is tearful.        X-ray SI joint-mild arthritis of her SI joints based on my read.  However radiology reads as normal.    Results for orders placed or performed in visit on 04/23/24 (from the past 24 hour(s))   CRP inflammation   Result Value Ref Range    CRP Inflammation <3.00 <5.00 mg/L   Comprehensive metabolic panel   Result Value Ref Range    Sodium 136 135 - 145 mmol/L    Potassium 4.3 3.4 - 5.3 mmol/L    Carbon Dioxide (CO2) 24 22 - 29 mmol/L    Anion Gap 9 7 - 15 mmol/L    Urea Nitrogen 18.7 8.0 - 23.0 mg/dL    Creatinine 0.80 0.51 - 0.95 mg/dL    GFR Estimate 83 >60 mL/min/1.73m2    Calcium 9.5 8.8 - 10.2 mg/dL    Chloride 103 98 - 107 mmol/L    Glucose 87 70 - 99 mg/dL    Alkaline Phosphatase 52 40 - 150 U/L    AST 25 0 - 45 U/L    ALT 15 0 - 50 U/L    Protein Total 7.6 6.4 - 8.3 g/dL    Albumin 4.2 3.5 - 5.2 g/dL    Bilirubin Total 0.4 <=1.2 mg/dL   CBC with Platelets & Differential    Narrative    The following orders were created for panel order CBC with Platelets & Differential.  Procedure                               Abnormality         Status                     ---------                               -----------         ------                     CBC with platelets and d...[[id number]]                      Final result                 Please view results for these tests on the individual orders.   CBC with platelets and differential   Result Value Ref Range    WBC Count 5.3 4.0 -  11.0 10e3/uL    RBC Count 4.53 3.80 - 5.20 10e6/uL    Hemoglobin 14.0 11.7 - 15.7 g/dL    Hematocrit 41.4 35.0 - 47.0 %    MCV 91 78 - 100 fL    MCH 30.9 26.5 - 33.0 pg    MCHC 33.8 31.5 - 36.5 g/dL    RDW 13.0 10.0 - 15.0 %    Platelet Count 271 150 - 450 10e3/uL    % Neutrophils 40 %    % Lymphocytes 46 %    % Monocytes 11 %    % Eosinophils 1 %    % Basophils 1 %    % Immature Granulocytes 0 %    NRBCs per 100 WBC 0 <1 /100    Absolute Neutrophils 2.1 1.6 - 8.3 10e3/uL    Absolute Lymphocytes 2.4 0.8 - 5.3 10e3/uL    Absolute Monocytes 0.6 0.0 - 1.3 10e3/uL    Absolute Eosinophils 0.1 0.0 - 0.7 10e3/uL    Absolute Basophils 0.1 0.0 - 0.2 10e3/uL    Absolute Immature Granulocytes 0.0 <=0.4 10e3/uL    Absolute NRBCs 0.0 10e3/uL           Signed Electronically by: Kelin Coronado MD

## 2024-04-23 NOTE — LETTER
My Depression Action Plan  Name: Danitza Brand   Date of Birth 1960  Date: 4/23/2024    My doctor: Kelin Coronado   My clinic: [facility] - [place]  [address] [place] [address]  [phone]            GREEN    ZONE   Good Control    What it looks like:   Things are going generally well. You have normal ups and downs. You may even feel depressed from time to time, but bad moods usually last less than a day.   What you need to do:  Continue to care for yourself (see self care plan)  Check your depression survival kit and update it as needed  Follow your physician s recommendations including any medication.  Do not stop taking medication unless you consult with your physician first.             YELLOW         ZONE Getting Worse    What it looks like:   Depression is starting to interfere with your life.   It may be hard to get out of bed; you may be starting to isolate yourself from others.  Symptoms of depression are starting to last most all day and this has happened for several days.   You may have suicidal thoughts but they are not constant.   What you need to do:     Call your care team. Your response to treatment will improve if you keep your care team informed of your progress. Yellow periods are signs an adjustment may need to be made.     Continue your self-care.  Just get dressed and ready for the day.  Don't give yourself time to talk yourself out of it.    Talk to someone in your support network.    Open up your Depression Self-Care Plan/Wellness Kit.             RED    ZONE Medical Alert - Get Help    What it looks like:   Depression is seriously interfering with your life.   You may experience these or other symptoms: You can t get out of bed most days, can t work or engage in other necessary activities, you have trouble taking care of basic hygiene, or basic responsibilities, thoughts of suicide or death that will not go away, self-injurious behavior.     What  you need to do:  Call your care team and request a same-day appointment. If they are not available (weekends or after hours) call your local crisis line, emergency room or 911.          Depression Self-Care Plan / Wellness Kit    Many people find that medication and therapy are helpful treatments for managing depression. In addition, making small changes to your everyday life can help to boost your mood and improve your wellbeing. Below are some tips for you to consider. Be sure to talk with your medical provider and/or behavioral health consultant if your symptoms are worsening or not improving.     Sleep   Sleep hygiene  means all of the habits that support good, restful sleep. It includes maintaining a consistent bedtime and wake time, using your bedroom only for sleeping or sex, and keeping the bedroom dark and free of distractions like a computer, smartphone, or television.     Develop a Healthy Routine  Maintain good hygiene. Get out of bed in the morning, make your bed, brush your teeth, take a shower, and get dressed. Don t spend too much time viewing media that makes you feel stressed. Find time to relax each day.    Exercise  Get some form of exercise every day. This will help reduce pain and release endorphins, the  feel good  chemicals in your brain. It can be as simple as just going for a walk or doing some gardening, anything that will get you moving.      Diet  Strive to eat healthy foods, including fruits and vegetables. Drink plenty of water. Avoid excessive sugar, caffeine, alcohol, and other mood-altering substances.     Stay Connected with Others  Stay in touch with friends and family members.    Manage Your Mood  Try deep breathing, massage therapy, biofeedback, or meditation. Take part in fun activities when you can. Try to find something to smile about each day.     Psychotherapy  Be open to working with a therapist if your provider recommends it.     Medication  Be sure to take your  medication as prescribed. Most anti-depressants need to be taken every day. It usually takes several weeks for medications to work. Not all medicines work for all people. It is important to follow-up with your provider to make sure you have a treatment plan that is working for you. Do not stop your medication abruptly without first discussing it with your provider.    Crisis Resources   These hotlines are for both adults and children. They and are open 24 hours a day, 7 days a week unless noted otherwise.    National Suicide Prevention Lifeline   988 or 1-482-664-TQLQ (5421)    Crisis Text Line    www.crisistextline.org  Text HOME to 528784 from anywhere in the [place], anytime, about any type of crisis. A live, trained crisis counselor will receive the text and respond quickly.    [facility] Lifeline for LGBTQ Youth  A national crisis intervention and suicide lifeline for LGBTQ youth under 25. Provides a safe place to talk without judgement. Call 1-626.245.5074; text START to 133681 or visit www.theAngelf[facility]vorproject.org to talk to a trained counselor.    For [facility] crisis numbers, visit the [facility] website at:  https://[place].gov/dhs/people-we-serve/adults/health-care/mental-health/resources/crisis-contacts.jsp

## 2024-04-23 NOTE — PATIENT INSTRUCTIONS
Stop naproxen.   Try robaxin during work - muscle relaxer that is less sedating   Norco for pain control - this is an opioid. Try to use 2x/day or less.   Keep chiropractor appointment for tomorrow.     A referral was placed for you to do physical therapy.   If this referral was to [facility], if you have not heard anything after 1 business day, please call [phone] to schedule an appointment.

## 2024-04-24 ENCOUNTER — OFFICE VISIT (OUTPATIENT)
Dept: CHIROPRACTIC MEDICINE | Facility: OTHER | Age: 64
End: 2024-04-24
Attending: CHIROPRACTOR
Payer: COMMERCIAL

## 2024-04-24 DIAGNOSIS — M99.02 SEGMENTAL AND SOMATIC DYSFUNCTION OF THORACIC REGION: ICD-10-CM

## 2024-04-24 DIAGNOSIS — M99.03 SEGMENTAL AND SOMATIC DYSFUNCTION OF LUMBAR REGION: Primary | ICD-10-CM

## 2024-04-24 DIAGNOSIS — M54.50 ACUTE RIGHT-SIDED LOW BACK PAIN WITHOUT SCIATICA: ICD-10-CM

## 2024-04-24 PROCEDURE — 98940 CHIROPRACT MANJ 1-2 REGIONS: CPT | Mod: AT | Performed by: CHIROPRACTOR

## 2024-04-24 PROCEDURE — 99202 OFFICE O/P NEW SF 15 MIN: CPT | Mod: 25 | Performed by: CHIROPRACTOR

## 2024-04-24 NOTE — PROGRESS NOTES
Subjective Finding:    Chief compalint: Patient presents with:  Back Pain: Right sided low back pain   , Pain Scale: 5/10, Intensity: sharp, Duration: 1 months, Radiating:  no.    Date of injury:     Activities that the pain restricts:   Home/household/hobbies/social activities: Yes.  Work duties: Yes.  Sleep: No.  Makes symptoms better: rest.  Makes symptoms worse: walking.  Have you seen anyone else for the symptoms? Yes: MD.  Work related: No.  Automobile related injury: No.    Objective and Assessment:    Posture Analysis:   High shoulder: .  Head tilt: .  High iliac crest: right.  Head carriage: neutral.  Thoracic Kyphosis: neutral.  Lumbar Lordosis: forward.    Lumbar Range of Motion: extension decreased.  Cervical Range of Motion: .  Thoracic Range of Motion: right lateral flexion decreased.  Extremity Range of Motion: .    Palpation:   Quad lumb: right, referred pain: no    Segmental dysfunction pre-treatment and treatment area: T6, T7, L5, and PSIS Right.    Assessment post-treatment:  Cervical: ROM increased.  Thoracic: ROM increased.  Lumbar: ROM increased.    Comments: .      Complicating Factors: .    Procedure(s):  [facility]:  88385 Chiropractic manipulative treatment 1-2 regions performed   Thoracic: Diversified, See above for level, Prone and Lumbar: Diversified, See above for level, Side posture    Modalities:  None performed this visit    Therapeutic procedures:  None    Plan:  Treatment plan: PRN.  Instructed patient: stretch as instructed at visit.  Short term goals: increase ROM.  Long term goals: restore normal function.  Prognosis: very good.

## 2024-04-30 ENCOUNTER — OFFICE VISIT (OUTPATIENT)
Dept: FAMILY MEDICINE | Facility: OTHER | Age: 64
End: 2024-04-30
Attending: STUDENT IN AN ORGANIZED HEALTH CARE EDUCATION/TRAINING PROGRAM
Payer: COMMERCIAL

## 2024-04-30 ENCOUNTER — OFFICE VISIT (OUTPATIENT)
Dept: CHIROPRACTIC MEDICINE | Facility: OTHER | Age: 64
End: 2024-04-30
Attending: CHIROPRACTOR
Payer: COMMERCIAL

## 2024-04-30 VITALS
BODY MASS INDEX: 23.89 KG/M2 | HEART RATE: 83 BPM | RESPIRATION RATE: 16 BRPM | DIASTOLIC BLOOD PRESSURE: 60 MMHG | OXYGEN SATURATION: 99 % | WEIGHT: 121.7 LBS | TEMPERATURE: 97.2 F | SYSTOLIC BLOOD PRESSURE: 104 MMHG | HEIGHT: 60 IN

## 2024-04-30 DIAGNOSIS — M99.02 SEGMENTAL AND SOMATIC DYSFUNCTION OF THORACIC REGION: ICD-10-CM

## 2024-04-30 DIAGNOSIS — M54.50 ACUTE RIGHT-SIDED LOW BACK PAIN WITHOUT SCIATICA: Primary | ICD-10-CM

## 2024-04-30 DIAGNOSIS — K59.00 ACUTE CONSTIPATION: ICD-10-CM

## 2024-04-30 DIAGNOSIS — M54.50 ACUTE BILATERAL LOW BACK PAIN WITHOUT SCIATICA: ICD-10-CM

## 2024-04-30 DIAGNOSIS — M62.830 BACK MUSCLE SPASM: ICD-10-CM

## 2024-04-30 DIAGNOSIS — M99.03 SEGMENTAL AND SOMATIC DYSFUNCTION OF LUMBAR REGION: Primary | ICD-10-CM

## 2024-04-30 PROCEDURE — 99213 OFFICE O/P EST LOW 20 MIN: CPT | Performed by: STUDENT IN AN ORGANIZED HEALTH CARE EDUCATION/TRAINING PROGRAM

## 2024-04-30 PROCEDURE — 98940 CHIROPRACT MANJ 1-2 REGIONS: CPT | Mod: AT | Performed by: CHIROPRACTOR

## 2024-04-30 RX ORDER — ASPIRIN 81 MG/1
81 TABLET ORAL DAILY
COMMUNITY

## 2024-04-30 RX ORDER — HYDROCODONE BITARTRATE AND ACETAMINOPHEN 5; 325 MG/1; MG/1
1 TABLET ORAL EVERY 6 HOURS PRN
Qty: 10 TABLET | Refills: 0 | Status: SHIPPED | OUTPATIENT
Start: 2024-04-30

## 2024-04-30 RX ORDER — SENNA AND DOCUSATE SODIUM 50; 8.6 MG/1; MG/1
1 TABLET, FILM COATED ORAL 2 TIMES DAILY
Qty: 14 TABLET | Refills: 0 | Status: SHIPPED | OUTPATIENT
Start: 2024-04-30 | End: 2024-05-07

## 2024-04-30 ASSESSMENT — ENCOUNTER SYMPTOMS: BACK PAIN: 1

## 2024-04-30 ASSESSMENT — PAIN SCALES - GENERAL: PAINLEVEL: MODERATE PAIN (4)

## 2024-04-30 NOTE — PROGRESS NOTES
Assessment & Plan     Acute right-sided low back pain without sciatica  Back muscle spasm  See previous notes 4/19 and 4/23/2024.   Improved with chiropractor, Robaxin, prednisone and as needed norco  Radiculopathy resolved, some lingering spasm  Has physical therapy set up in two weeks, 2 additional chiro sessions planned   No red flags  Will continue supportive care. Did refill norco x1 additional fill to help get her through work days (taking 1/day right now for pain), trying to wean.   Discussed if not resolved or continuing to improve in another week, need follow up and MRI imaging. She was in agreement. Aware of monitoring for red flags.   - HYDROcodone-acetaminophen (NORCO) 5-325 MG tablet; Take 1 tablet by mouth every 6 hours as needed for severe pain    Acute constipation  Discussed hydration. Can try miralax. Will give senna-docusate to use while taking Norco, which can worsen constipation.   - SENNA-docusate sodium (SENNA S) 8.6-50 MG tablet; Take 1 tablet by mouth 2 times daily for 7 days        Anna Marie Montenegro is a 63 year old, presenting for the following health issues:  Back Pain        4/30/2024    10:13 AM   Additional Questions   Roomed by April   Accompanied by self         4/30/2024    10:13 AM   Patient Reported Additional Medications   Patient reports taking the following new medications none     Back Pain          Pain History:  When did you first notice your pain? 6 weeks   Have you seen this provider for your pain in the past? Yes   Where in your body do you have pain? Lower back  Are you seeing anyone else for your pain? Yes - chiropractor         9/13/2023     9:13 AM 4/19/2024    10:45 AM   PHQ-9 SCORE   PHQ-9 Total Score MyChart 5 (Mild depression) 6 (Mild depression)   PHQ-9 Total Score 5 6           9/13/2023     9:14 AM   BRISEIDA-7 SCORE   Total Score 2 (minimal anxiety)   Total Score 2           9/13/2023     9:13 AM 4/19/2024    10:45 AM   PHQ-9 SCORE   PHQ-9 Total Score MyChart 5  (Mild depression) 6 (Mild depression)   PHQ-9 Total Score 5 6           9/13/2023     9:14 AM   BRISEIDA-7 SCORE   Total Score 2 (minimal anxiety)   Total Score 2     Chronic Pain Follow Up:    Location of pain: lower back  Analgesia/pain control:    - Recent changes:  no    - Overall control: Tolerable with discomfort    - Current treatments: Norco   Adherence:     - Do you ever take more pain medicine than prescribed? No    - When did you take your last dose of pain medicine?  2am today   Adverse effects: Yes: constipation        Toradol shot lasted one day  Saw chiro, had amazing relief after one session - marked improvement with gradual increase back  Did come back over the weekend but has session with him today  Much more manageable today   Didn't notice much with steroids   Did do Norco (has 4-5 left) - some constipation  Robaxin helps - more so than flexeril - but needs two and two makes her mariluz  Has been doing one norco and one muscle relaxer every AM and can do 10 hour shift; does extra muscle relaxer in the afternoon   Additional chiro next week also with Wero Cody   Rest is the answer with bad pain  No longer going down into the thigh  Still right upper back   Walks to walk and be active          Objective    /60 (BP Location: Right arm, Patient Position: Sitting, Cuff Size: Adult Regular)   Pulse 83   Temp 97.2  F (36.2  C) (Tympanic)   Resp 16   Ht 1.524 m (5')   Wt 55.2 kg (121 lb 11.2 oz)   SpO2 99%   BMI 23.77 kg/m    Body mass index is 23.77 kg/m .    Physical Exam  Constitutional:       General: She is not in acute distress.     Appearance: Normal appearance. She is not ill-appearing.   Pulmonary:      Effort: Pulmonary effort is normal.   Neurological:      Mental Status: She is alert.      Gait: Gait normal.   Psychiatric:         Mood and Affect: Mood normal.         Behavior: Behavior normal.         Thought Content: Thought content normal.         Judgment: Judgment normal.               Signed Electronically by: Kelin Coronado MD

## 2024-04-30 NOTE — PATIENT INSTRUCTIONS
Okay to still use Norco but try to avoid if able.   If after next two sessions with Wero, you are not getting more sustained relief (more than a day), let me know and we will get an MRI.

## 2024-05-01 NOTE — PROGRESS NOTES
Subjective Finding:    Chief compalint: Patient presents with:  Back Pain: Better with the tx  , Pain Scale: 2/10, Intensity: sharp, Duration: 1 months, Radiating:  no.    Date of injury:     Activities that the pain restricts:   Home/household/hobbies/social activities: Yes.  Work duties: Yes.  Sleep: No.  Makes symptoms better: rest.  Makes symptoms worse: walking.  Have you seen anyone else for the symptoms? Yes: MD.  Work related: No.  Automobile related injury: No.    Objective and Assessment:    Posture Analysis:   High shoulder: .  Head tilt: .  High iliac crest: right.  Head carriage: neutral.  Thoracic Kyphosis: neutral.  Lumbar Lordosis: forward.    Lumbar Range of Motion: extension decreased.  Cervical Range of Motion: .  Thoracic Range of Motion: right lateral flexion decreased.  Extremity Range of Motion: .    Palpation:   Quad lumb: right, referred pain: no    Segmental dysfunction pre-treatment and treatment area: T6, T7, L5, and PSIS Right.    Assessment post-treatment:  Cervical: ROM increased.  Thoracic: ROM increased.  Lumbar: ROM increased.    Comments: .      Complicating Factors: .    Procedure(s):  [facility]:  32050 Chiropractic manipulative treatment 1-2 regions performed   Thoracic: Diversified, See above for level, Prone and Lumbar: Diversified, See above for level, Side posture    Modalities:  None performed this visit    Therapeutic procedures:  None    Plan:  Treatment plan: PRN.  Instructed patient: stretch as instructed at visit.  Short term goals: increase ROM.  Long term goals: restore normal function.  Prognosis: very good.

## 2024-05-07 ENCOUNTER — OFFICE VISIT (OUTPATIENT)
Dept: CHIROPRACTIC MEDICINE | Facility: OTHER | Age: 64
End: 2024-05-07
Attending: CHIROPRACTOR
Payer: COMMERCIAL

## 2024-05-07 DIAGNOSIS — M99.02 SEGMENTAL AND SOMATIC DYSFUNCTION OF THORACIC REGION: ICD-10-CM

## 2024-05-07 DIAGNOSIS — M54.50 ACUTE RIGHT-SIDED LOW BACK PAIN WITHOUT SCIATICA: ICD-10-CM

## 2024-05-07 DIAGNOSIS — M99.03 SEGMENTAL AND SOMATIC DYSFUNCTION OF LUMBAR REGION: Primary | ICD-10-CM

## 2024-05-07 PROCEDURE — 98940 CHIROPRACT MANJ 1-2 REGIONS: CPT | Mod: AT | Performed by: CHIROPRACTOR

## 2024-05-07 NOTE — PROGRESS NOTES
Subjective Finding:    Chief compalint: Patient presents with:  Back Pain: Right mid and low back pain   , Pain Scale: 4/10, Intensity: dull, Duration: 2 months, Radiating: no.    Date of injury:     Activities that the pain restricts:   Home/household/hobbies/social activities: Yes.  Work duties: Yes.  Sleep: No.  Makes symptoms better: rest.  Makes symptoms worse: thoracic extension and thoracic flexion.  Have you seen anyone else for the symptoms? No.  Work related: No.  Automobile related injury: No.    Objective and Assessment:    Posture Analysis:   High shoulder: .  Head tilt: .  High iliac crest: .  Head carriage: neutral.  Thoracic Kyphosis: neutral.  Lumbar Lordosis: forward.    Lumbar Range of Motion: extension decreased and right lateral flexion decreased.  Cervical Range of Motion: .  Thoracic Range of Motion: .  Extremity Range of Motion: .    Palpation:   Intercostal: sharp pain, referred pain: no    Segmental dysfunction pre-treatment and treatment area: T6, T7, T8, and L1.    Assessment post-treatment:  Cervical: ROM increased.  Thoracic: ROM increased.  Lumbar: ROM increased.    Comments: .      Complicating Factors: .    Procedure(s):  [facility]:  72111 Chiropractic manipulative treatment 1-2 regions performed   Thoracic: Diversified, See above for level, Prone and Lumbar: Diversified, See above for level, Side posture    Modalities:  None performed this visit    Therapeutic procedures:  None    Plan:  Treatment plan: PRN.  Instructed patient: stretch as instructed at visit.  Short term goals: increase ROM.  Long term goals: restore normal function.  Prognosis: very good.

## 2024-05-13 ENCOUNTER — OFFICE VISIT (OUTPATIENT)
Dept: CHIROPRACTIC MEDICINE | Facility: OTHER | Age: 64
End: 2024-05-13
Attending: CHIROPRACTOR
Payer: COMMERCIAL

## 2024-05-13 DIAGNOSIS — M99.02 SEGMENTAL AND SOMATIC DYSFUNCTION OF THORACIC REGION: ICD-10-CM

## 2024-05-13 DIAGNOSIS — M99.03 SEGMENTAL AND SOMATIC DYSFUNCTION OF LUMBAR REGION: Primary | ICD-10-CM

## 2024-05-13 DIAGNOSIS — M54.50 ACUTE RIGHT-SIDED LOW BACK PAIN WITHOUT SCIATICA: ICD-10-CM

## 2024-05-13 PROCEDURE — 98940 CHIROPRACT MANJ 1-2 REGIONS: CPT | Mod: AT | Performed by: CHIROPRACTOR

## 2024-05-13 NOTE — PROGRESS NOTES
Subjective Finding:    Chief compalint: Patient presents with:  Back Pain: Doing much better   , Pain Scale: 3/10, Intensity: sharp, Duration: 2 weeks, Radiating: no.    Date of injury:     Activities that the pain restricts:   Home/household/hobbies/social activities: Yes.  Work duties: No.  Sleep: No.  Makes symptoms better: rest.  Makes symptoms worse: lumbar extension and lumbar flexion.  Have you seen anyone else for the symptoms? No.  Work related: No.  Automobile related injury: No.    Objective and Assessment:    Posture Analysis:   High shoulder: .  Head tilt: .  High iliac crest: .  Head carriage: neutral.  Thoracic Kyphosis: neutral.  Lumbar Lordosis: forward.    Lumbar Range of Motion: extension decreased.  Cervical Range of Motion: .  Thoracic Range of Motion: .  Extremity Range of Motion: .    Palpation:   Quad lumb: bilateral, referred pain: no    Segmental dysfunction pre-treatment and treatment area: T6, L4, and L5.    Assessment post-treatment:  Cervical: .  Thoracic: ROM increased.  Lumbar: ROM increased.    Comments: .      Complicating Factors: .    Procedure(s):  [facility]T:  50454 Chiropractic manipulative treatment 1-2 regions performed   Thoracic: Diversified, See above for level, Prone and Lumbar: Diversified, See above for level, Side posture    Modalities:  None performed this visit    Therapeutic procedures:  None    Plan:  Treatment plan: PRN.  Instructed patient: stretch as instructed at visit.  Short term goals: increase ROM.  Long term goals: restore normal function.  Prognosis: very good.

## 2024-05-17 ENCOUNTER — THERAPY VISIT (OUTPATIENT)
Dept: PHYSICAL THERAPY | Facility: HOSPITAL | Age: 64
End: 2024-05-17
Attending: STUDENT IN AN ORGANIZED HEALTH CARE EDUCATION/TRAINING PROGRAM
Payer: COMMERCIAL

## 2024-05-17 DIAGNOSIS — M54.50 ACUTE RIGHT-SIDED LOW BACK PAIN WITHOUT SCIATICA: ICD-10-CM

## 2024-05-17 DIAGNOSIS — M53.3 SACROILIAC JOINT PAIN: ICD-10-CM

## 2024-05-17 DIAGNOSIS — M62.830 BACK MUSCLE SPASM: ICD-10-CM

## 2024-05-17 PROCEDURE — 97140 MANUAL THERAPY 1/> REGIONS: CPT | Mod: GP

## 2024-05-17 PROCEDURE — 97161 PT EVAL LOW COMPLEX 20 MIN: CPT | Mod: GP

## 2024-05-17 PROCEDURE — 97110 THERAPEUTIC EXERCISES: CPT | Mod: GP

## 2024-05-17 NOTE — PROGRESS NOTES
PHYSICAL THERAPY EVALUATION  Type of Visit: Evaluation    See electronic medical record for Abuse and Falls Screening details.    Subjective       Presenting condition or subjective complaint: Pt presents to PT c c/o R sided mid back pain that started around Easter. Thought that she pulled her lat muscle. She has been doing chiropractic, taking muscle relaxers and pain medication. Laying flat on her back feels good. Sitting and driving is painful. Works from home and has sit stand desk. Uses heat wrap around her body. She is taking a lot of motrin and Tylenol together. If she rolls overat night pain immediately gets bad.  Date of onset: 05/17/24    Relevant medical history: Arthritis; Depression; Menopause; Overweight   Dates & types of surgery: N/A    Prior diagnostic imaging/testing results: X-ray     Prior therapy history for the same diagnosis, illness or injury: No      Prior Level of Function  Transfers: Independent  Ambulation: Independent  ADL: Independent  IADL:     Living Environment  Social support: With family members   Type of home: House   Stairs to enter the home: No       Ramp: No   Stairs inside the home: Yes 20 Is there a railing: Yes   Help at home: Home and Yard maintenance tasks  Equipment owned:       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  Hobbies/Interests: Walking himing gardening reading    Patient goals for therapy: Exercise, rest, sit, drive without pain    Pain assessment: Pain present     Objective   CERVICAL SPINE EVALUATION  PAIN: Pain Level at Rest: 3/10  Pain Level with Use: 8/10  Pain Location: thoracic spine  Pain Quality: Aching and Sharp  Pain Frequency: intermittent  Pain is Worst: daytime  Pain is Exacerbated By: Movement, standing, sitting  Pain is Relieved By: NSAIDs, otc medications, and Laying on back  Pain Progression: Improved  INTEGUMENTARY (edema, incisions): WFL  POSTURE: WFL  GAIT:   Weightbearing Status:     Assistive Device(s):   Gait Deviations:    BALANCE/PROPRIOCEPTION:   WEIGHTBEARING ALIGNMENT: WFL  ROM: AROM WFL  MYOTOMES:   DERMATOMES: WNL  FLEXIBILITY: Thoracic rotation to right impaired   SPECIAL TESTS:   PALPATION:  Painful to palpation at R sided 9th rib  SPINAL SEGMENTAL CONCLUSIONS: Hypo T9    Assessment & Plan   CLINICAL IMPRESSIONS  Medical Diagnosis: Acute right-sided low back pain without sciatica  Back muscle spasm    Treatment Diagnosis: R sided rib/thoracic pain   Impression/Assessment: Patient is a 63 year old female with R sided thoracic/rib pain complaints.  The following significant findings have been identified: Pain, Decreased ROM/flexibility, Decreased joint mobility, and Impaired muscle performance. These impairments interfere with their ability to perform self care tasks, work tasks, recreational activities, household chores, and driving  as compared to previous level of function.     Clinical Decision Making (Complexity):  Clinical Presentation: Stable/Uncomplicated  Clinical Presentation Rationale: based on medical and personal factors listed in PT evaluation  Clinical Decision Making (Complexity): Low complexity    PLAN OF CARE  Treatment Interventions:  Modalities: Cryotherapy, E-stim, Hot Pack, Ultrasound  Interventions: Manual Therapy, Neuromuscular Re-education, Therapeutic Activity, Therapeutic Exercise    Long Term Goals     PT Goal 1  Goal Identifier: STG 1  Goal Description: Pt will demonstrate knowledge/understanding of HEP and report daily compliance  Target Date: 05/31/24  PT Goal 2  Goal Identifier: LTG 1  Goal Description: Pt will complete daily activities without being limited by R sided back pain  Target Date: 07/12/24  PT Goal 3  Goal Identifier: LTG 2  Goal Description: Pt will sleep through the night without being disturbed by R sided back pain  Target Date: 07/12/24  PT Goal 4  Goal Identifier: LTG 3  Goal Description: Pt will resume recreational activities including exercise and yoga without be woken up by  back pain  Target Date: 07/12/24      Frequency of Treatment: 1-2x/week  Duration of Treatment: up to 8 weeks    Recommended Referrals to Other Professionals:   Education Assessment:        Risks and benefits of evaluation/treatment have been explained.   Patient/Family/caregiver agrees with Plan of Care.     Evaluation Time:     PT Eval, Low Complexity Minutes (19120): 15     Signing Clinician: Zuri Alexandre PT

## 2024-05-24 ENCOUNTER — THERAPY VISIT (OUTPATIENT)
Dept: PHYSICAL THERAPY | Facility: HOSPITAL | Age: 64
End: 2024-05-24
Attending: STUDENT IN AN ORGANIZED HEALTH CARE EDUCATION/TRAINING PROGRAM
Payer: COMMERCIAL

## 2024-05-24 DIAGNOSIS — M54.50 ACUTE RIGHT-SIDED LOW BACK PAIN WITHOUT SCIATICA: Primary | ICD-10-CM

## 2024-05-24 PROCEDURE — 97140 MANUAL THERAPY 1/> REGIONS: CPT | Mod: GP

## 2024-05-31 ENCOUNTER — THERAPY VISIT (OUTPATIENT)
Dept: PHYSICAL THERAPY | Facility: HOSPITAL | Age: 64
End: 2024-05-31
Attending: STUDENT IN AN ORGANIZED HEALTH CARE EDUCATION/TRAINING PROGRAM
Payer: COMMERCIAL

## 2024-05-31 DIAGNOSIS — M54.50 ACUTE RIGHT-SIDED LOW BACK PAIN WITHOUT SCIATICA: Primary | ICD-10-CM

## 2024-05-31 PROCEDURE — 97140 MANUAL THERAPY 1/> REGIONS: CPT | Mod: GP

## 2024-05-31 PROCEDURE — 97110 THERAPEUTIC EXERCISES: CPT | Mod: GP

## 2024-06-14 ENCOUNTER — THERAPY VISIT (OUTPATIENT)
Dept: PHYSICAL THERAPY | Facility: HOSPITAL | Age: 64
End: 2024-06-14
Attending: STUDENT IN AN ORGANIZED HEALTH CARE EDUCATION/TRAINING PROGRAM
Payer: COMMERCIAL

## 2024-06-14 DIAGNOSIS — M54.50 ACUTE RIGHT-SIDED LOW BACK PAIN WITHOUT SCIATICA: Primary | ICD-10-CM

## 2024-06-14 PROCEDURE — 97140 MANUAL THERAPY 1/> REGIONS: CPT | Mod: GP

## 2024-06-17 ENCOUNTER — TELEPHONE (OUTPATIENT)
Dept: FAMILY MEDICINE | Facility: OTHER | Age: 64
End: 2024-06-17

## 2024-06-17 NOTE — TELEPHONE ENCOUNTER
----- Message from Kelin Coronado MD sent at 6/17/2024  6:03 AM CDT -----  Please call patient and see if she would like to see me this week - please fit in. Her physical therapy messaged me.  ----- Message -----  From: Zuri Alexandre, PT  Sent: 6/14/2024   1:39 PM CDT  To: Kelin Coronado MD    Hi Dr. Coronado, I've been treating Moni for about a month and her pain is still severe on R side of mid back and ribs. Rates it at an 8/10 at the worst. I wondering if we can get her in for an x-ray? Thank you! Zuri

## 2024-06-17 NOTE — TELEPHONE ENCOUNTER
Attempt # 1  Outcome: Left Message   Comment: LVM for patient to call to make an appt with Dr Coronado to be seen about her pain.

## 2024-06-21 ENCOUNTER — ANCILLARY PROCEDURE (OUTPATIENT)
Dept: GENERAL RADIOLOGY | Facility: OTHER | Age: 64
End: 2024-06-21
Attending: STUDENT IN AN ORGANIZED HEALTH CARE EDUCATION/TRAINING PROGRAM
Payer: COMMERCIAL

## 2024-06-21 ENCOUNTER — OFFICE VISIT (OUTPATIENT)
Dept: FAMILY MEDICINE | Facility: OTHER | Age: 64
End: 2024-06-21
Attending: STUDENT IN AN ORGANIZED HEALTH CARE EDUCATION/TRAINING PROGRAM
Payer: COMMERCIAL

## 2024-06-21 VITALS
DIASTOLIC BLOOD PRESSURE: 66 MMHG | RESPIRATION RATE: 16 BRPM | HEART RATE: 81 BPM | SYSTOLIC BLOOD PRESSURE: 102 MMHG | OXYGEN SATURATION: 99 % | BODY MASS INDEX: 23.73 KG/M2 | TEMPERATURE: 98.2 F | WEIGHT: 121.5 LBS

## 2024-06-21 DIAGNOSIS — M54.6 CHRONIC RIGHT-SIDED THORACIC BACK PAIN: ICD-10-CM

## 2024-06-21 DIAGNOSIS — M54.50 ACUTE RIGHT-SIDED LOW BACK PAIN WITHOUT SCIATICA: Primary | ICD-10-CM

## 2024-06-21 DIAGNOSIS — G89.29 CHRONIC RIGHT-SIDED THORACIC BACK PAIN: ICD-10-CM

## 2024-06-21 DIAGNOSIS — M62.830 BACK MUSCLE SPASM: ICD-10-CM

## 2024-06-21 PROBLEM — N95.2 ATROPHIC VAGINITIS: Status: RESOLVED | Noted: 2022-03-28 | Resolved: 2024-06-21

## 2024-06-21 PROCEDURE — G2211 COMPLEX E/M VISIT ADD ON: HCPCS | Performed by: STUDENT IN AN ORGANIZED HEALTH CARE EDUCATION/TRAINING PROGRAM

## 2024-06-21 PROCEDURE — 72072 X-RAY EXAM THORAC SPINE 3VWS: CPT | Mod: TC | Performed by: RADIOLOGY

## 2024-06-21 PROCEDURE — 99213 OFFICE O/P EST LOW 20 MIN: CPT | Performed by: STUDENT IN AN ORGANIZED HEALTH CARE EDUCATION/TRAINING PROGRAM

## 2024-06-21 RX ORDER — METHOCARBAMOL 750 MG/1
750-1500 TABLET, FILM COATED ORAL 3 TIMES DAILY PRN
Qty: 30 TABLET | Refills: 0 | Status: SHIPPED | OUTPATIENT
Start: 2024-06-21

## 2024-06-21 ASSESSMENT — PAIN SCALES - GENERAL: PAINLEVEL: MILD PAIN (3)

## 2024-06-21 ASSESSMENT — PATIENT HEALTH QUESTIONNAIRE - PHQ9
10. IF YOU CHECKED OFF ANY PROBLEMS, HOW DIFFICULT HAVE THESE PROBLEMS MADE IT FOR YOU TO DO YOUR WORK, TAKE CARE OF THINGS AT HOME, OR GET ALONG WITH OTHER PEOPLE: NOT DIFFICULT AT ALL
SUM OF ALL RESPONSES TO PHQ QUESTIONS 1-9: 7
SUM OF ALL RESPONSES TO PHQ QUESTIONS 1-9: 7

## 2024-06-21 NOTE — PROGRESS NOTES
Assessment & Plan     Acute right-sided low back pain without sciatica  Back muscle spasm  Chronic right-sided thoracic back pain  See notes for 19 2024, 4/23/2024 and 4/30/2024.  Presents today for continued pain, although this is predominantly right thoracic pain compared to prior pain at those visits.  Has been engaging in chiropractor treatment and physical therapy with minimal change in her symptoms.  Did receive a message from the chiropractor concern that this was not improving and she has been having some night pain.  X-ray today with no bony lesion or deformity.  With her symptoms and duration, with nonresponse to supportive care, we will proceed with her imaging.  As her pain is all in the thoracic area, thoracic MRI at this time will be best.  Did refill Robaxin, using rarely.  Follow-up pending MRI.  - methocarbamol (ROBAXIN) 750 MG tablet; Take 1-2 tablets (750-1,500 mg) by mouth 3 times daily as needed for muscle spasms Do not take with flexeril. Try predominantly in AM for work.  - XR THORACIC SPINE 3 VW (Clinic Performed); Future  - MR Thoracic Spine w/o Contrast; Future      The longitudinal plan of care for the diagnosis(es)/condition(s) as documented were addressed during this visit. Due to the added complexity in care, I will continue to support Moni in the subsequent management and with ongoing continuity of care.    Subjective   Moni is a 63 year old, presenting for the following health issues:  Back Pain        6/21/2024     1:54 PM   Additional Questions   Roomed by Jeremías Morris   Accompanied by Self         6/21/2024     1:54 PM   Patient Reported Additional Medications   Patient reports taking the following new medications none     History of Present Illness       Back Pain:  She presents for follow up of back pain. Patient's back pain is a recurring problem.  Location of back pain:  Right middle of back  Description of back pain: dull ache  Back pain spreads: nowhere    Since patient  first noticed back pain, pain is: gradually improving  Does back pain interfere with her job:  Yes       She eats 2-3 servings of fruits and vegetables daily.She consumes 0 sweetened beverage(s) daily.She exercises with enough effort to increase her heart rate 9 or less minutes per day.  She exercises with enough effort to increase her heart rate 3 or less days per week.   She is taking medications regularly.       Chronic/Recurring Back Pain Follow Up    Where is your back pain located? (Select all that apply) middle of back right  How would you describe your back pain?  dull ache  Where does your back pain spread? nowhere  Since your last clinic visit for back pain, how has your pain changed? gradually improving  Does your back pain interfere with your job? YES  Since your last visit, have you tried any new treatment? Yes -  chiropractor and Physical Therapy    Chiro helped the first few times.   Physical therapy somewhat helpful - slight improvement every week.   Doing stretching. Foam roller, etc.   Still pain in chair, riding in a car, rotating at night causes pain  Not going down leg anymore  Feels like pain is more on the upper to mid right back and will wrap around her side into her ribs at times.  Hard to get comfortable at night.  When she moves she will get the spasm.        Objective    /66 (BP Location: Right arm, Patient Position: Sitting, Cuff Size: Adult Regular)   Pulse 81   Temp 98.2  F (36.8  C) (Tympanic)   Resp 16   Wt 55.1 kg (121 lb 8 oz)   SpO2 99%   BMI 23.73 kg/m    Body mass index is 23.73 kg/m .  Physical Exam  Constitutional:       General: She is not in acute distress.     Appearance: Normal appearance. She is not ill-appearing.   Musculoskeletal:      Comments: Normal gait.  She is comfortable sitting in the chair.  She points to just above the thoracolumbar area on the right side of her back as the area of pain.  No cervical, thoracic, or lumbar tenderness.  No tenderness  over the SI joint.  There is no rash or lesions on the skin.  There is no significant muscle spasm.  She does not have any evident weakness.   Neurological:      Mental Status: She is alert.                Signed Electronically by: Kelin Coronado MD

## 2024-07-19 ENCOUNTER — HOSPITAL ENCOUNTER (OUTPATIENT)
Dept: MRI IMAGING | Facility: HOSPITAL | Age: 64
Discharge: HOME | End: 2024-07-19
Attending: STUDENT IN AN ORGANIZED HEALTH CARE EDUCATION/TRAINING PROGRAM | Admitting: STUDENT IN AN ORGANIZED HEALTH CARE EDUCATION/TRAINING PROGRAM
Payer: COMMERCIAL

## 2024-07-19 DIAGNOSIS — M54.6 CHRONIC RIGHT-SIDED THORACIC BACK PAIN: ICD-10-CM

## 2024-07-19 DIAGNOSIS — G89.29 CHRONIC RIGHT-SIDED THORACIC BACK PAIN: ICD-10-CM

## 2024-07-19 PROCEDURE — 72146 MRI CHEST SPINE W/O DYE: CPT

## 2024-07-22 DIAGNOSIS — Z78.0 ASYMPTOMATIC MENOPAUSAL STATE: ICD-10-CM

## 2024-07-22 DIAGNOSIS — S22.060A COMPRESSION FRACTURE OF T7 VERTEBRA, INITIAL ENCOUNTER (H): Primary | ICD-10-CM

## 2024-09-20 ENCOUNTER — ANCILLARY PROCEDURE (OUTPATIENT)
Dept: MAMMOGRAPHY | Facility: OTHER | Age: 64
End: 2024-09-20
Attending: STUDENT IN AN ORGANIZED HEALTH CARE EDUCATION/TRAINING PROGRAM
Payer: COMMERCIAL

## 2024-09-20 ENCOUNTER — TELEPHONE (OUTPATIENT)
Dept: MAMMOGRAPHY | Facility: HOSPITAL | Age: 64
End: 2024-09-20

## 2024-09-20 DIAGNOSIS — Z12.31 ENCOUNTER FOR SCREENING MAMMOGRAM FOR BREAST CANCER: ICD-10-CM

## 2024-09-20 PROCEDURE — 77067 SCR MAMMO BI INCL CAD: CPT | Mod: TC | Performed by: RADIOLOGY

## 2024-09-20 PROCEDURE — 77063 BREAST TOMOSYNTHESIS BI: CPT | Mod: TC | Performed by: RADIOLOGY

## 2024-10-08 ENCOUNTER — TELEPHONE (OUTPATIENT)
Dept: FAMILY MEDICINE | Facility: OTHER | Age: 64
End: 2024-10-08

## 2024-10-08 ENCOUNTER — HOSPITAL ENCOUNTER (OUTPATIENT)
Dept: BONE DENSITY | Facility: HOSPITAL | Age: 64
Discharge: HOME | End: 2024-10-08
Attending: STUDENT IN AN ORGANIZED HEALTH CARE EDUCATION/TRAINING PROGRAM | Admitting: STUDENT IN AN ORGANIZED HEALTH CARE EDUCATION/TRAINING PROGRAM
Payer: COMMERCIAL

## 2024-10-08 DIAGNOSIS — S22.060A COMPRESSION FRACTURE OF T7 VERTEBRA, INITIAL ENCOUNTER (H): ICD-10-CM

## 2024-10-08 DIAGNOSIS — Z78.0 ASYMPTOMATIC MENOPAUSAL STATE: ICD-10-CM

## 2024-10-08 DIAGNOSIS — M80.80XD LOCALIZED OSTEOPOROSIS WITH CURRENT PATHOLOGICAL FRACTURE WITH ROUTINE HEALING, SUBSEQUENT ENCOUNTER: Primary | ICD-10-CM

## 2024-10-08 PROCEDURE — 77080 DXA BONE DENSITY AXIAL: CPT

## 2024-10-08 NOTE — RESULT ENCOUNTER NOTE
Please call and notify patient that her DEXA scan, bone scan, does show osteoporosis/weaker bones.  I would like to discuss her risks for this, necessary labs, and treatment options.  Please schedule an in person visit, phone visit is also acceptable but patient will need to come in separately for labs.

## 2024-10-08 NOTE — TELEPHONE ENCOUNTER
Results requested: results     Best number to reach patient at: [phone]     Best time to call patient: before 9:30 am 10/09/24    Send to care team in basket.

## 2024-10-08 NOTE — TELEPHONE ENCOUNTER
Called patient she didn't have time to go over results at the time, would like us to call back before 930 AM tomorrow.

## 2024-10-11 ENCOUNTER — OFFICE VISIT (OUTPATIENT)
Dept: FAMILY MEDICINE | Facility: OTHER | Age: 64
End: 2024-10-11
Attending: STUDENT IN AN ORGANIZED HEALTH CARE EDUCATION/TRAINING PROGRAM
Payer: COMMERCIAL

## 2024-10-11 VITALS
BODY MASS INDEX: 24.39 KG/M2 | SYSTOLIC BLOOD PRESSURE: 102 MMHG | OXYGEN SATURATION: 98 % | TEMPERATURE: 97.4 F | WEIGHT: 121 LBS | DIASTOLIC BLOOD PRESSURE: 60 MMHG | HEART RATE: 82 BPM | HEIGHT: 59 IN | RESPIRATION RATE: 18 BRPM

## 2024-10-11 DIAGNOSIS — M80.80XD LOCALIZED OSTEOPOROSIS WITH CURRENT PATHOLOGICAL FRACTURE WITH ROUTINE HEALING, SUBSEQUENT ENCOUNTER: Primary | ICD-10-CM

## 2024-10-11 DIAGNOSIS — L82.1 SEBORRHEIC KERATOSIS: ICD-10-CM

## 2024-10-11 DIAGNOSIS — B37.9 CANDIDIASIS: ICD-10-CM

## 2024-10-11 DIAGNOSIS — T45.2X1D POISONING BY VITAMIN D, ACCIDENTAL OR UNINTENTIONAL, SUBSEQUENT ENCOUNTER: ICD-10-CM

## 2024-10-11 DIAGNOSIS — S22.060A COMPRESSION FRACTURE OF T7 VERTEBRA, INITIAL ENCOUNTER (H): ICD-10-CM

## 2024-10-11 LAB
ALBUMIN SERPL BCG-MCNC: 4.3 G/DL (ref 3.5–5.2)
ALBUMIN UR-MCNC: NEGATIVE MG/DL
ALP SERPL-CCNC: 45 U/L (ref 40–150)
ALT SERPL W P-5'-P-CCNC: 19 U/L (ref 0–50)
AMORPH CRY #/AREA URNS HPF: ABNORMAL /HPF
ANION GAP SERPL CALCULATED.3IONS-SCNC: 12 MMOL/L (ref 7–15)
APPEARANCE UR: ABNORMAL
AST SERPL W P-5'-P-CCNC: 25 U/L (ref 0–45)
BACTERIA #/AREA URNS HPF: ABNORMAL /HPF
BASOPHILS # BLD AUTO: 0 10E3/UL (ref 0–0.2)
BASOPHILS NFR BLD AUTO: 0 %
BILIRUB SERPL-MCNC: 0.4 MG/DL
BILIRUB UR QL STRIP: NEGATIVE
BUN SERPL-MCNC: 21 MG/DL (ref 8–23)
CALCIUM SERPL-MCNC: 9 MG/DL (ref 8.8–10.4)
CHLORIDE SERPL-SCNC: 107 MMOL/L (ref 98–107)
COLOR UR AUTO: YELLOW
CREAT SERPL-MCNC: 1.03 MG/DL (ref 0.51–0.95)
EGFRCR SERPLBLD CKD-EPI 2021: 61 ML/MIN/1.73M2
EOSINOPHIL # BLD AUTO: 0.1 10E3/UL (ref 0–0.7)
EOSINOPHIL NFR BLD AUTO: 1 %
ERYTHROCYTE [DISTWIDTH] IN BLOOD BY AUTOMATED COUNT: 12.8 % (ref 10–15)
GLUCOSE SERPL-MCNC: 91 MG/DL (ref 70–99)
GLUCOSE UR STRIP-MCNC: NEGATIVE MG/DL
HCO3 SERPL-SCNC: 24 MMOL/L (ref 22–29)
HCT VFR BLD AUTO: 39.7 % (ref 35–47)
HGB BLD-MCNC: 13.4 G/DL (ref 11.7–15.7)
HGB UR QL STRIP: NEGATIVE
IMM GRANULOCYTES # BLD: 0 10E3/UL
IMM GRANULOCYTES NFR BLD: 0 %
KETONES UR STRIP-MCNC: NEGATIVE MG/DL
LEUKOCYTE ESTERASE UR QL STRIP: NEGATIVE
LYMPHOCYTES # BLD AUTO: 2.4 10E3/UL (ref 0.8–5.3)
LYMPHOCYTES NFR BLD AUTO: 38 %
MCH RBC QN AUTO: 30.4 PG (ref 26.5–33)
MCHC RBC AUTO-ENTMCNC: 33.8 G/DL (ref 31.5–36.5)
MCV RBC AUTO: 90 FL (ref 78–100)
MONOCYTES # BLD AUTO: 0.5 10E3/UL (ref 0–1.3)
MONOCYTES NFR BLD AUTO: 8 %
NEUTROPHILS # BLD AUTO: 3.3 10E3/UL (ref 1.6–8.3)
NEUTROPHILS NFR BLD AUTO: 53 %
NITRATE UR QL: NEGATIVE
NRBC # BLD AUTO: 0 10E3/UL
NRBC BLD AUTO-RTO: 0 /100
PH UR STRIP: 7.5 [PH] (ref 4.7–8)
PHOSPHATE SERPL-MCNC: 3.7 MG/DL (ref 2.5–4.5)
PLATELET # BLD AUTO: 246 10E3/UL (ref 150–450)
POTASSIUM SERPL-SCNC: 4 MMOL/L (ref 3.4–5.3)
PROT SERPL-MCNC: 6.8 G/DL (ref 6.4–8.3)
RBC # BLD AUTO: 4.41 10E6/UL (ref 3.8–5.2)
RBC URINE: 0 /HPF
SODIUM SERPL-SCNC: 143 MMOL/L (ref 135–145)
SP GR UR STRIP: 1.02 (ref 1–1.03)
SQUAMOUS EPITHELIAL: 0 /HPF
UROBILINOGEN UR STRIP-MCNC: NORMAL MG/DL
VIT D+METAB SERPL-MCNC: 219 NG/ML (ref 20–50)
WBC # BLD AUTO: 6.3 10E3/UL (ref 4–11)
WBC URINE: <1 /HPF
YEAST #/AREA URNS HPF: ABNORMAL /HPF

## 2024-10-11 PROCEDURE — 84100 ASSAY OF PHOSPHORUS: CPT | Performed by: STUDENT IN AN ORGANIZED HEALTH CARE EDUCATION/TRAINING PROGRAM

## 2024-10-11 PROCEDURE — 81001 URINALYSIS AUTO W/SCOPE: CPT | Performed by: STUDENT IN AN ORGANIZED HEALTH CARE EDUCATION/TRAINING PROGRAM

## 2024-10-11 PROCEDURE — 82306 VITAMIN D 25 HYDROXY: CPT | Performed by: STUDENT IN AN ORGANIZED HEALTH CARE EDUCATION/TRAINING PROGRAM

## 2024-10-11 PROCEDURE — 36415 COLL VENOUS BLD VENIPUNCTURE: CPT | Performed by: STUDENT IN AN ORGANIZED HEALTH CARE EDUCATION/TRAINING PROGRAM

## 2024-10-11 PROCEDURE — G2211 COMPLEX E/M VISIT ADD ON: HCPCS | Performed by: STUDENT IN AN ORGANIZED HEALTH CARE EDUCATION/TRAINING PROGRAM

## 2024-10-11 PROCEDURE — 80053 COMPREHEN METABOLIC PANEL: CPT | Performed by: STUDENT IN AN ORGANIZED HEALTH CARE EDUCATION/TRAINING PROGRAM

## 2024-10-11 PROCEDURE — 99214 OFFICE O/P EST MOD 30 MIN: CPT | Performed by: STUDENT IN AN ORGANIZED HEALTH CARE EDUCATION/TRAINING PROGRAM

## 2024-10-11 PROCEDURE — 85025 COMPLETE CBC W/AUTO DIFF WBC: CPT | Performed by: STUDENT IN AN ORGANIZED HEALTH CARE EDUCATION/TRAINING PROGRAM

## 2024-10-11 RX ORDER — FLUCONAZOLE 150 MG/1
150 TABLET ORAL ONCE
Qty: 1 TABLET | Refills: 0 | Status: SHIPPED | OUTPATIENT
Start: 2024-10-11 | End: 2024-10-11

## 2024-10-11 ASSESSMENT — PATIENT HEALTH QUESTIONNAIRE - PHQ9
SUM OF ALL RESPONSES TO PHQ QUESTIONS 1-9: 6
SUM OF ALL RESPONSES TO PHQ QUESTIONS 1-9: 6
10. IF YOU CHECKED OFF ANY PROBLEMS, HOW DIFFICULT HAVE THESE PROBLEMS MADE IT FOR YOU TO DO YOUR WORK, TAKE CARE OF THINGS AT HOME, OR GET ALONG WITH OTHER PEOPLE: NOT DIFFICULT AT ALL

## 2024-10-11 ASSESSMENT — PAIN SCALES - GENERAL: PAINLEVEL: NO PAIN (0)

## 2024-10-11 NOTE — PROGRESS NOTES
Assessment & Plan     Localized osteoporosis with current pathological fracture with routine healing, subsequent encounter  Compression fracture of T7 vertebra, initial encounter (H)  T score lumbar -3.5, hip -3, femoral neck -3.3 and T7 compression fracture  Risks of early menopause, low BMI, family history   No previous dexa scan. No past history of treatment.   Vitamin D level quite high.  Will need recheck.  Patient was asymptomatic at our visit.  Needs to stop supplement and hydrate.  Other labs overall stable.  Urine with no protein.  Calcium normal.  Discussed increased bone building exercise, muscle strengthening, calcium intake of 1200 mg daily, and usually vitamin D intake of 1000 units.  With her more severe osteoporosis and fracture, would likely benefit from Forteo.  Will plan endocrinology consult regarding prescribing Forteo and any further recommendations.  Patient was agreeable with this plan.  - CBC with Platelets & Differential; Future  - Comprehensive metabolic panel; Future  - UA with Microscopic reflex to Culture - [place]; Future  - Vitamin D Deficiency; Future  - Phosphorus; Future  - CBC with Platelets & Differential  - Comprehensive metabolic panel  - UA with Microscopic reflex to Culture - [place]  - Vitamin D Deficiency  - Phosphorus    ADDENDUM 10/14/2024 8:13 AM: patient was taking D3 03068 units of vitamin daily for quite some time.     Seborrheic keratosis  On scalp.  Reviewed benign nature.  Reviewed monitoring and concerning features of moles.    Candidiasis  Noted in urine.  Will treat.  - fluconazole (DIFLUCAN) 150 MG tablet; Take 1 tablet (150 mg) by mouth once for 1 dose.      The longitudinal plan of care for the diagnosis(es)/condition(s) as documented were addressed during this visit. Due to the added complexity in care, I will continue to support Moni in the subsequent management and with ongoing continuity of care.    Subjective   Moni is a 64 year old, presenting for the  following health issues:  Results and Musculoskeletal Problem        10/11/2024     9:19 AM   Additional Questions   Roomed by April   Accompanied by self         10/11/2024     9:19 AM   Patient Reported Additional Medications   Patient reports taking the following new medications none     History of Present Illness       Reason for visit:  Follow up deca scan + labs    She eats 2-3 servings of fruits and vegetables daily.She consumes 0 sweetened beverage(s) daily.She exercises with enough effort to increase her heart rate 10 to 19 minutes per day.  She exercises with enough effort to increase her heart rate 4 days per week.   She is taking medications regularly.       Patient presents today for follow-up of her DEXA scan.  DEXA scan was ordered after she had a thoracic compression fracture discovered on thoracic MRI for continued thoracic back pain and muscle spasm.    Menopause age: 40-45 (no hormone replacement)  Family history of osteoporosis: mother, unsure of medication (?fosamax)  Parents/siblings with fractures: brother broken ankles multiple times  Calcium intake: no milk or yogurt. Supplement intermittently throughout years. Taking calcium now - unsure of dose.   Vitamin D intake: taking 1000u  Prior fractures: ankle, 2-3 times; thoracic compression fracture recently   Fevers/night sweats/weight loss: no  Alcohol use: seldom, once a month   Smoking history: none  Prednisone use: 1-2 in 20s - no daily use   Exercise: was doing yoga 3x/wk. Walks a lot.   BMI 24    Contraindications: No esophageal disorders (achalasia, esophageal stricture, esophageal varices, Martin's esophagus) or inability to stay upright for 30 min after taking dose. No history of bariatric surgery with anastomoses (Lilia-en-Y gastric bypass).     Risks discussed including atypical femur fractures, GI irritation (esophagitis, perforation), hypocalcemia, osteonecrosis of jaw.    Estimated Creatinine Clearance: 62.4 mL/min (based on SCr of  "0.8 mg/dL).    Lesion on right scalp for a year or two. Not changing.  No bleeding or itching.        Objective    /60 (BP Location: Right arm, Patient Position: Sitting, Cuff Size: Adult Regular)   Pulse 82   Temp 97.4  F (36.3  C) (Tympanic)   Resp 18   Ht 1.499 m (4' 11\")   Wt 54.9 kg (121 lb)   SpO2 98%   BMI 24.44 kg/m    Body mass index is 24.44 kg/m .    Physical Exam  Constitutional:       General: She is not in acute distress.     Appearance: Normal appearance. She is not ill-appearing.   Pulmonary:      Effort: Pulmonary effort is normal.   Skin:     Comments: 3 x 5 stuck on appearing skin lesion on the right temporal scalp.  Dermoscopy reveals milia like cysts and comedo like openings.   Neurological:      General: No focal deficit present.      Mental Status: She is alert and oriented to person, place, and time.      Gait: Gait normal.   Psychiatric:         Mood and Affect: Mood normal.         Behavior: Behavior normal.          Narrative & Impression   PROCEDURE: DX AXIAL HIPS/SPINE 10/8/2024 8:16 AM     HISTORY: h/o spinal fracture; Compression fracture of T7 vertebra,  initial encounter (H); Asymptomatic menopausal state     COMPARISONS: None.     TECHNIQUE: DEXA bone mineral density study of the lumbar spine and  left hip     FINDINGS: Bone mineral density in the left hip L1-L4 measures 0.662  g/sq cm with a T score of -3.5. Bone mineral density in the left hip  measures 0.570 g/sq cm with a T score of -3. Bone mineral density in  the femoral neck measured 0.485 g/sq cm with a T score of -3.3.                                                                        IMPRESSION: The patient is osteoporotic and fracture risk is high     MILLICENT MARS MD         SYSTEM ID:  [id number]       Results for orders placed or performed in visit on 10/11/24   Comprehensive metabolic panel     Status: Abnormal   Result Value Ref Range    Sodium 143 135 - 145 mmol/L    Potassium 4.0 3.4 - 5.3 mmol/L "    Carbon Dioxide (CO2) 24 22 - 29 mmol/L    Anion Gap 12 7 - 15 mmol/L    Urea Nitrogen 21.0 8.0 - 23.0 mg/dL    Creatinine 1.03 (H) 0.51 - 0.95 mg/dL    GFR Estimate 61 >60 mL/min/1.73m2    Calcium 9.0 8.8 - 10.4 mg/dL    Chloride 107 98 - 107 mmol/L    Glucose 91 70 - 99 mg/dL    Alkaline Phosphatase 45 40 - 150 U/L    AST 25 0 - 45 U/L    ALT 19 0 - 50 U/L    Protein Total 6.8 6.4 - 8.3 g/dL    Albumin 4.3 3.5 - 5.2 g/dL    Bilirubin Total 0.4 <=1.2 mg/dL   UA with Microscopic reflex to Culture - [place]     Status: Abnormal    Specimen: Urine, Clean Catch   Result Value Ref Range    Color Urine Yellow Colorless, Straw, Light Yellow, Yellow    Appearance Urine Cloudy (A) Clear    Glucose Urine Negative Negative mg/dL    Bilirubin Urine Negative Negative    Ketones Urine Negative Negative mg/dL    Specific Gravity Urine 1.018 1.003 - 1.035    Blood Urine Negative Negative    pH Urine 7.5 4.7 - 8.0    Protein Albumin Urine Negative Negative mg/dL    Urobilinogen Urine Normal Normal, 2.0 mg/dL    Nitrite Urine Negative Negative    Leukocyte Esterase Urine Negative Negative    Bacteria Urine Few (A) None Seen /HPF    Budding Yeast Urine Few (A) None Seen /HPF    Amorphous Crystals Urine Few (A) None Seen /HPF    RBC Urine 0 <=2 /HPF    WBC Urine <1 <=5 /HPF    Squamous Epithelials Urine 0 <=1 /HPF    Narrative    Urine Culture not indicated   Vitamin D Deficiency     Status: Abnormal   Result Value Ref Range    Vitamin D, Total (25-Hydroxy) 219 (H) 20 - 50 ng/mL    Narrative    Season, race, dietary intake, and treatment affect the concentration of 25-hydroxy-Vitamin D. Values may decrease during winter months and increase during summer months.    Vitamin D determination is routinely performed by an immunoassay specific for 25 hydroxyvitamin D3.  If an individual is on vitamin D2(ergocalciferol) supplementation, please specify 25 OH vitamin D2 and D3 level determination by LCMSMS test [id number].     Phosphorus      Status: Normal   Result Value Ref Range    Phosphorus 3.7 2.5 - 4.5 mg/dL   CBC with platelets and differential     Status: None   Result Value Ref Range    WBC Count 6.3 4.0 - 11.0 10e3/uL    RBC Count 4.41 3.80 - 5.20 10e6/uL    Hemoglobin 13.4 11.7 - 15.7 g/dL    Hematocrit 39.7 35.0 - 47.0 %    MCV 90 78 - 100 fL    MCH 30.4 26.5 - 33.0 pg    MCHC 33.8 31.5 - 36.5 g/dL    RDW 12.8 10.0 - 15.0 %    Platelet Count 246 150 - 450 10e3/uL    % Neutrophils 53 %    % Lymphocytes 38 %    % Monocytes 8 %    % Eosinophils 1 %    % Basophils 0 %    % Immature Granulocytes 0 %    NRBCs per 100 WBC 0 <1 /100    Absolute Neutrophils 3.3 1.6 - 8.3 10e3/uL    Absolute Lymphocytes 2.4 0.8 - 5.3 10e3/uL    Absolute Monocytes 0.5 0.0 - 1.3 10e3/uL    Absolute Eosinophils 0.1 0.0 - 0.7 10e3/uL    Absolute Basophils 0.0 0.0 - 0.2 10e3/uL    Absolute Immature Granulocytes 0.0 <=0.4 10e3/uL    Absolute NRBCs 0.0 10e3/uL   CBC with Platelets & Differential     Status: None    Narrative    The following orders were created for panel order CBC with Platelets & Differential.  Procedure                               Abnormality         Status                     ---------                               -----------         ------                     CBC with platelets and d...[[id number]]                      Final result                 Please view results for these tests on the individual orders.             Signed Electronically by: Kelin Coronado MD

## 2024-10-17 ENCOUNTER — E-CONSULT (OUTPATIENT)
Dept: ENDOCRINOLOGY | Facility: CLINIC | Age: 64
End: 2024-10-17
Payer: COMMERCIAL

## 2024-10-17 PROCEDURE — 99451 NTRPROF PH1/NTRNET/EHR 5/>: CPT

## 2024-10-18 ENCOUNTER — LAB (OUTPATIENT)
Dept: LAB | Facility: OTHER | Age: 64
End: 2024-10-18
Payer: COMMERCIAL

## 2024-10-18 DIAGNOSIS — T45.2X1D POISONING BY VITAMIN D, ACCIDENTAL OR UNINTENTIONAL, SUBSEQUENT ENCOUNTER: ICD-10-CM

## 2024-10-18 DIAGNOSIS — M80.80XD LOCALIZED OSTEOPOROSIS WITH CURRENT PATHOLOGICAL FRACTURE WITH ROUTINE HEALING, SUBSEQUENT ENCOUNTER: ICD-10-CM

## 2024-10-18 DIAGNOSIS — S22.060A COMPRESSION FRACTURE OF T7 VERTEBRA, INITIAL ENCOUNTER (H): ICD-10-CM

## 2024-10-18 LAB
PTH-INTACT SERPL-MCNC: 21 PG/ML (ref 15–65)
TOTAL PROTEIN SERUM FOR ELP: 6.6 G/DL (ref 6.4–8.3)

## 2024-10-18 PROCEDURE — 84165 PROTEIN E-PHORESIS SERUM: CPT | Mod: 26 | Performed by: PATHOLOGY

## 2024-10-18 PROCEDURE — 84155 ASSAY OF PROTEIN SERUM: CPT

## 2024-10-18 PROCEDURE — 84165 PROTEIN E-PHORESIS SERUM: CPT | Performed by: PATHOLOGY

## 2024-10-18 PROCEDURE — 36415 COLL VENOUS BLD VENIPUNCTURE: CPT

## 2024-10-18 PROCEDURE — 83970 ASSAY OF PARATHORMONE: CPT

## 2024-10-18 NOTE — PROGRESS NOTES
"    10/17/2024     E-Consult has been accepted.    Interprofessional consultation requested by:  Kelin Coronado MD      Clinical Question/Purpose: MY CLINICAL QUESTION IS: would you recommend Forteo over a bisphosphonate? If so, what would I need to monitor if prescribing in primary care.     Patient assessment and information reviewed:   Past Medical History:   Diagnosis Date    Arthritis 1990s    Atrophic vaginitis 2022    Compression fracture of T7 vertebra (H) 07/19/2024    Depressive disorder 1990s    Talus fracture 05/11/2016    TIA (transient ischemic attack)      9/13/23 height 60 inches , BMI 25.1   10/11/24 height 59\", BMI 24.44  vitamin D 219, Ca 9, phos 3.7, alk phos 45, creatinine 1.03, Hgb 13.4    10/8/24 DXA   Lowest T-score -3.5  L spine     Current Outpatient Medications   Medication Sig Dispense Refill    aspirin 81 MG EC tablet Take 81 mg by mouth daily      buPROPion (WELLBUTRIN XL) 150 MG 24 hr tablet Take 1 tablet (150 mg) by mouth every morning 90 tablet 3    methocarbamol (ROBAXIN) 750 MG tablet Take 1-2 tablets (750-1,500 mg) by mouth 3 times daily as needed for muscle spasms Do not take with flexeril. Try predominantly in AM for work. 30 tablet 0       Impression:  Osteoporosis  T7 compression superior endplate   Hypervitaminosis D  Postmenopausal  Height loss 1 inch ?     Recommendations:   Hold vitamin D at least 2 months .  The T1/2 of vitamin D is 2 weeks  Labs: SPEP, PTH.    IF the above labs are normal then Forteo for 2 years followed by denosumab for 2.5 years followed by Reclast yearly for 3 years would be the strongest treatment and sequence.  The drugs shown below might be considered as first line agents, taking into account drug insert prescribing instructions and warnings, insurance limitations and patient preferences.  Any treatment is better than no treatment.  If Forteo is started check labs every 6 months starting 1 month after starting Forteo: calcium, creatinine, uric " acid.  If SPEP or PTH are abnormal, then I wouldn't use Forteo or abaloparatide.           strength 2-yr   Inc   Spine   BMD 2-yr  Inc  Hip  BMD RRR of spine   fracture RRR  non-spine fracture dosing Mechanism   Requires follow up antiresorptive?  Potential   Transplant   risks Major side effects limitations   teriparatide 1 8-10% 1.5-2% 65-70% 35% Once/day sc  for 2 years Builds bone yes no Osteosarcoma risk; high calcium/kidney stones High PTH; Radiation history   abaloparatide 1 10% 2-3% 70-80% 40% Once/day sc for 2 years Builds bone yes no Same as teriparatide   Romosozumab 1 11% (1 yr) 4% (1 yr) 48%  vs alendronate 20% vs   alendronate Sc once/month for 12 months; Builds bone; reduces bone loss yes no ONJ, atypical fracture; cardiovascular; low calcium Low calcium; cardiac history   Denosumab 2 6-8% 3-4% 68% 20% Every 6 months; Reduces bone loss yes Interacts with immune system ONJ, atypical fracture'; infection; low calcium Low calcium; precise timing is crtitical     Zoledronic acid 3 5-6% 3-4% 79% 25% Once/yr IV Reduces bone loss  No Acute aches; low calcium Low calcium; kidney function   Oral bisphosphonate 4 3-5% 2-3% 40-53% 0-20% Weekly  Reduces bone loss    Kidney function   raloxifene 5 2-3% 1% 50% --  SERM   Thromboembolism; hot flashes thrombosis       The recommendations provided in this E-Consult are based on a review of clinical data pertinent to the clinical question presented, without a review of the patient's complete medical record or, the benefit of a comprehensive in-person or virtual patient evaluation. This consultation should not replace the clinical judgement and evaluation of the provider ordering this E-Consult. Any new clinical issues, or changes in patient status since the filing of this E-Consult will need to be taken into account when assessing these recommendations. Please contact me if you have further questions.    My total time spent reviewing clinical information and  formulating assessment was 20 minutes.        Zenaida Manzano MD

## 2024-10-21 LAB
ALBUMIN SERPL ELPH-MCNC: 4.2 G/DL (ref 3.7–5.1)
ALPHA1 GLOB SERPL ELPH-MCNC: 0.2 G/DL (ref 0.2–0.4)
ALPHA2 GLOB SERPL ELPH-MCNC: 0.5 G/DL (ref 0.5–0.9)
B-GLOBULIN SERPL ELPH-MCNC: 0.7 G/DL (ref 0.6–1)
GAMMA GLOB SERPL ELPH-MCNC: 0.9 G/DL (ref 0.7–1.6)
LOCATION OF TASK: NORMAL
M PROTEIN SERPL ELPH-MCNC: 0 G/DL
PROT PATTERN SERPL ELPH-IMP: NORMAL

## 2024-10-22 PROCEDURE — 81050 URINALYSIS VOLUME MEASURE: CPT | Performed by: PATHOLOGY

## 2024-10-22 PROCEDURE — 84166 PROTEIN E-PHORESIS/URINE/CSF: CPT | Mod: 26 | Performed by: PATHOLOGY

## 2024-10-22 PROCEDURE — 84166 PROTEIN E-PHORESIS/URINE/CSF: CPT | Performed by: PATHOLOGY

## 2024-10-23 ENCOUNTER — APPOINTMENT (OUTPATIENT)
Dept: LAB | Facility: OTHER | Age: 64
End: 2024-10-23
Payer: COMMERCIAL

## 2024-10-24 DIAGNOSIS — M80.80XD LOCALIZED OSTEOPOROSIS WITH CURRENT PATHOLOGICAL FRACTURE WITH ROUTINE HEALING, SUBSEQUENT ENCOUNTER: Primary | ICD-10-CM

## 2024-10-24 DIAGNOSIS — S22.060A COMPRESSION FRACTURE OF T7 VERTEBRA, INITIAL ENCOUNTER (H): ICD-10-CM

## 2024-10-24 LAB
LOCATION OF TASK: NORMAL
PROT PATTERN UR ELPH-IMP: NORMAL

## 2024-10-25 RX ORDER — TERIPARATIDE 250 UG/ML
20 INJECTION, SOLUTION SUBCUTANEOUS DAILY
Qty: 2.4 ML | Refills: 3 | Status: SHIPPED | OUTPATIENT
Start: 2024-10-25

## 2024-10-25 NOTE — PROGRESS NOTES
Discussed with patient.  She was agreeable to do Forteo once daily for 2 years.  Discussed required monitoring.  Reviewed side effects.  Plan lab recheck at 1 month.  If unable to get through [facility], will send to [facility].  She will let me know if any side effects develop.  Will reach out if needing instruction on how to do subcutaneous injection with our nurses.  Discussed they could show her how to do an injection but cannot do the actual injection for her. All questions answered.

## 2024-10-29 ENCOUNTER — TELEPHONE (OUTPATIENT)
Dept: FAMILY MEDICINE | Facility: OTHER | Age: 64
End: 2024-10-29

## 2024-10-29 NOTE — TELEPHONE ENCOUNTER
Received PA request from [employer] for teriparatide, recombinant, (FORTEO) 600 MCG/2.4ML SOPN injection. Submitted on CMM, waiting for response

## 2024-10-30 NOTE — TELEPHONE ENCOUNTER
Received PA DENIAL from [facility] for teriparatide, recombinant, (FORTEO) 600 MCG/2.4ML SOPN injection. Scanned into [employer], routed to provider.  Reasoning: Must try formularies TERIPARATIDE ands TYMLOS

## 2024-12-12 ENCOUNTER — LAB (OUTPATIENT)
Dept: LAB | Facility: OTHER | Age: 64
End: 2024-12-12
Attending: STUDENT IN AN ORGANIZED HEALTH CARE EDUCATION/TRAINING PROGRAM
Payer: COMMERCIAL

## 2024-12-12 ENCOUNTER — OFFICE VISIT (OUTPATIENT)
Dept: FAMILY MEDICINE | Facility: OTHER | Age: 64
End: 2024-12-12
Attending: STUDENT IN AN ORGANIZED HEALTH CARE EDUCATION/TRAINING PROGRAM
Payer: COMMERCIAL

## 2024-12-12 VITALS
OXYGEN SATURATION: 100 % | DIASTOLIC BLOOD PRESSURE: 75 MMHG | HEIGHT: 59 IN | SYSTOLIC BLOOD PRESSURE: 109 MMHG | BODY MASS INDEX: 24.46 KG/M2 | TEMPERATURE: 97.9 F | WEIGHT: 121.31 LBS | HEART RATE: 84 BPM | RESPIRATION RATE: 16 BRPM

## 2024-12-12 DIAGNOSIS — F33.42 RECURRENT MAJOR DEPRESSIVE DISORDER, IN FULL REMISSION (H): ICD-10-CM

## 2024-12-12 DIAGNOSIS — T45.2X1D POISONING BY VITAMIN D, ACCIDENTAL OR UNINTENTIONAL, SUBSEQUENT ENCOUNTER: ICD-10-CM

## 2024-12-12 DIAGNOSIS — Z23 NEED FOR DIPHTHERIA-TETANUS-PERTUSSIS (TDAP) VACCINE: ICD-10-CM

## 2024-12-12 DIAGNOSIS — Z00.00 ROUTINE GENERAL MEDICAL EXAMINATION AT A HEALTH CARE FACILITY: Primary | ICD-10-CM

## 2024-12-12 DIAGNOSIS — M80.80XD LOCALIZED OSTEOPOROSIS WITH CURRENT PATHOLOGICAL FRACTURE WITH ROUTINE HEALING, SUBSEQUENT ENCOUNTER: ICD-10-CM

## 2024-12-12 DIAGNOSIS — Z00.00 ROUTINE GENERAL MEDICAL EXAMINATION AT A HEALTH CARE FACILITY: ICD-10-CM

## 2024-12-12 DIAGNOSIS — E78.2 MIXED HYPERLIPIDEMIA: ICD-10-CM

## 2024-12-12 LAB
ALBUMIN SERPL BCG-MCNC: 4.6 G/DL (ref 3.5–5.2)
ALP SERPL-CCNC: 65 U/L (ref 40–150)
ALT SERPL W P-5'-P-CCNC: 18 U/L (ref 0–50)
ANION GAP SERPL CALCULATED.3IONS-SCNC: 11 MMOL/L (ref 7–15)
AST SERPL W P-5'-P-CCNC: 19 U/L (ref 0–45)
BASOPHILS # BLD AUTO: 0 10E3/UL (ref 0–0.2)
BASOPHILS NFR BLD AUTO: 1 %
BILIRUB SERPL-MCNC: 0.5 MG/DL
BUN SERPL-MCNC: 22.3 MG/DL (ref 8–23)
CALCIUM SERPL-MCNC: 9.8 MG/DL (ref 8.8–10.4)
CHLORIDE SERPL-SCNC: 103 MMOL/L (ref 98–107)
CHOLEST SERPL-MCNC: 229 MG/DL
CREAT SERPL-MCNC: 0.73 MG/DL (ref 0.51–0.95)
EGFRCR SERPLBLD CKD-EPI 2021: >90 ML/MIN/1.73M2
EOSINOPHIL # BLD AUTO: 0.1 10E3/UL (ref 0–0.7)
EOSINOPHIL NFR BLD AUTO: 1 %
ERYTHROCYTE [DISTWIDTH] IN BLOOD BY AUTOMATED COUNT: 12.3 % (ref 10–15)
FASTING STATUS PATIENT QL REPORTED: NO
FASTING STATUS PATIENT QL REPORTED: NO
GLUCOSE SERPL-MCNC: 98 MG/DL (ref 70–99)
HCO3 SERPL-SCNC: 27 MMOL/L (ref 22–29)
HCT VFR BLD AUTO: 44.2 % (ref 35–47)
HDLC SERPL-MCNC: 80 MG/DL
HGB BLD-MCNC: 14.9 G/DL (ref 11.7–15.7)
IMM GRANULOCYTES # BLD: 0 10E3/UL
IMM GRANULOCYTES NFR BLD: 0 %
LDLC SERPL CALC-MCNC: 134 MG/DL
LYMPHOCYTES # BLD AUTO: 3.1 10E3/UL (ref 0.8–5.3)
LYMPHOCYTES NFR BLD AUTO: 44 %
MCH RBC QN AUTO: 30.7 PG (ref 26.5–33)
MCHC RBC AUTO-ENTMCNC: 33.7 G/DL (ref 31.5–36.5)
MCV RBC AUTO: 91 FL (ref 78–100)
MONOCYTES # BLD AUTO: 0.7 10E3/UL (ref 0–1.3)
MONOCYTES NFR BLD AUTO: 9 %
NEUTROPHILS # BLD AUTO: 3.1 10E3/UL (ref 1.6–8.3)
NEUTROPHILS NFR BLD AUTO: 45 %
NONHDLC SERPL-MCNC: 149 MG/DL
NRBC # BLD AUTO: 0 10E3/UL
NRBC BLD AUTO-RTO: 0 /100
PLATELET # BLD AUTO: 274 10E3/UL (ref 150–450)
POTASSIUM SERPL-SCNC: 4.2 MMOL/L (ref 3.4–5.3)
PROT SERPL-MCNC: 7.9 G/DL (ref 6.4–8.3)
RBC # BLD AUTO: 4.86 10E6/UL (ref 3.8–5.2)
SODIUM SERPL-SCNC: 141 MMOL/L (ref 135–145)
TRIGL SERPL-MCNC: 73 MG/DL
TSH SERPL DL<=0.005 MIU/L-ACNC: 3.15 UIU/ML (ref 0.3–4.2)
URATE SERPL-MCNC: 3.2 MG/DL (ref 2.4–5.7)
VIT D+METAB SERPL-MCNC: 210 NG/ML (ref 20–50)
WBC # BLD AUTO: 6.9 10E3/UL (ref 4–11)

## 2024-12-12 PROCEDURE — 80050 GENERAL HEALTH PANEL: CPT

## 2024-12-12 PROCEDURE — 82306 VITAMIN D 25 HYDROXY: CPT

## 2024-12-12 PROCEDURE — 80061 LIPID PANEL: CPT

## 2024-12-12 PROCEDURE — 84550 ASSAY OF BLOOD/URIC ACID: CPT

## 2024-12-12 PROCEDURE — 36415 COLL VENOUS BLD VENIPUNCTURE: CPT

## 2024-12-12 RX ORDER — BUPROPION HYDROCHLORIDE 150 MG/1
150 TABLET ORAL EVERY MORNING
Qty: 90 TABLET | Refills: 3 | Status: SHIPPED | OUTPATIENT
Start: 2024-12-12

## 2024-12-12 RX ORDER — TERIPARATIDE 250 UG/ML
20 INJECTION, SOLUTION SUBCUTANEOUS DAILY
Qty: 2.48 ML | Refills: 0 | Status: SHIPPED | OUTPATIENT
Start: 2024-12-12 | End: 2024-12-12

## 2024-12-12 RX ORDER — TERIPARATIDE 250 UG/ML
20 INJECTION, SOLUTION SUBCUTANEOUS DAILY
Qty: 2.48 ML | Refills: 0 | Status: SHIPPED | OUTPATIENT
Start: 2024-12-12

## 2024-12-12 SDOH — HEALTH STABILITY: PHYSICAL HEALTH: ON AVERAGE, HOW MANY DAYS PER WEEK DO YOU ENGAGE IN MODERATE TO STRENUOUS EXERCISE (LIKE A BRISK WALK)?: 2 DAYS

## 2024-12-12 SDOH — HEALTH STABILITY: PHYSICAL HEALTH: ON AVERAGE, HOW MANY MINUTES DO YOU ENGAGE IN EXERCISE AT THIS LEVEL?: 40 MIN

## 2024-12-12 ASSESSMENT — PATIENT HEALTH QUESTIONNAIRE - PHQ9
SUM OF ALL RESPONSES TO PHQ QUESTIONS 1-9: 7
10. IF YOU CHECKED OFF ANY PROBLEMS, HOW DIFFICULT HAVE THESE PROBLEMS MADE IT FOR YOU TO DO YOUR WORK, TAKE CARE OF THINGS AT HOME, OR GET ALONG WITH OTHER PEOPLE: SOMEWHAT DIFFICULT
SUM OF ALL RESPONSES TO PHQ QUESTIONS 1-9: 7

## 2024-12-12 ASSESSMENT — SOCIAL DETERMINANTS OF HEALTH (SDOH)
HOW OFTEN DO YOU GET TOGETHER WITH FRIENDS OR RELATIVES?: ONCE A WEEK
HOW OFTEN DO YOU GET TOGETHER WITH FRIENDS OR RELATIVES?: ONCE A WEEK

## 2024-12-12 ASSESSMENT — PAIN SCALES - GENERAL: PAINLEVEL_OUTOF10: NO PAIN (0)

## 2024-12-12 NOTE — LETTER
December 18, 2024      Moni Brand  [address] [place] [address]        Dear ,    We are writing to inform you of your test results.    Please double check your supplements.  Check if there is vitamin D in a multivitamin. How much milk, cheese, cottage cheese, or yogurt are you eating? Make sure you check your home supplements and that you are not still getting vitamin D.  -Kelin Coronado MD     Resulted Orders   Comprehensive metabolic panel   Result Value Ref Range    Sodium 141 135 - 145 mmol/L    Potassium 4.2 3.4 - 5.3 mmol/L    Carbon Dioxide (CO2) 27 22 - 29 mmol/L    Anion Gap 11 7 - 15 mmol/L    Urea Nitrogen 22.3 8.0 - 23.0 mg/dL    Creatinine 0.73 0.51 - 0.95 mg/dL    GFR Estimate >90 >60 mL/min/1.73m2      Comment:      eGFR calculated using 2021 CKD-EPI equation.    Calcium 9.8 8.8 - 10.4 mg/dL      Comment:      Reference intervals for this test were updated on 7/16/2024 to reflect our healthy population more accurately. There may be differences in the flagging of prior results with similar values performed with this method. Those prior results can be interpreted in the context of the updated reference intervals.    Chloride 103 98 - 107 mmol/L    Glucose 98 70 - 99 mg/dL    Alkaline Phosphatase 65 40 - 150 U/L    AST 19 0 - 45 U/L    ALT 18 0 - 50 U/L    Protein Total 7.9 6.4 - 8.3 g/dL    Albumin 4.6 3.5 - 5.2 g/dL    Bilirubin Total 0.5 <=1.2 mg/dL    Patient Fasting > 8hrs? No    Lipid Profile   Result Value Ref Range    Cholesterol 229 (H) <200 mg/dL    Triglycerides 73 <150 mg/dL    Direct Measure HDL 80 >=50 mg/dL    LDL Cholesterol Calculated 134 (H) <100 mg/dL    Non HDL Cholesterol 149 (H) <130 mg/dL    Patient Fasting > 8hrs? No     Narrative    Cholesterol  Desirable: < 200 mg/dL  Borderline High: 200 - 239 mg/dL  High: >= 240 mg/dL    Triglycerides  Normal: < 150 mg/dL  Borderline High: 150 - 199 mg/dL  High: 200-499 mg/dL  Very High: >= 500 mg/dL    Direct Measure  HDL  Female: >= 50 mg/dL   Male: >= 40 mg/dL    LDL Cholesterol  Desirable: < 100 mg/dL  Above Desirable: 100 - 129 mg/dL   Borderline High: 130 - 159 mg/dL   High:  160 - 189 mg/dL   Very High: >= 190 mg/dL    Non HDL Cholesterol  Desirable: < 130 mg/dL  Above Desirable: 130 - 159 mg/dL  Borderline High: 160 - 189 mg/dL  High: 190 - 219 mg/dL  Very High: >= 220 mg/dL   TSH with free T4 reflex   Result Value Ref Range    TSH 3.15 0.30 - 4.20 uIU/mL   Uric acid   Result Value Ref Range    Uric Acid 3.2 2.4 - 5.7 mg/dL   Vitamin D Deficiency   Result Value Ref Range    Vitamin D, Total (25-Hydroxy) 210 (H) 20 - 50 ng/mL      Comment:      toxicity possible    Narrative    Season, race, dietary intake, and treatment affect the concentration of 25-hydroxy-Vitamin D. Values may decrease during winter months and increase during summer months.    Vitamin D determination is routinely performed by an immunoassay specific for 25 hydroxyvitamin D3.  If an individual is on vitamin D2(ergocalciferol) supplementation, please specify 25 OH vitamin D2 and D3 level determination by LCMSMS test [id number].     CBC with platelets and differential   Result Value Ref Range    WBC Count 6.9 4.0 - 11.0 10e3/uL    RBC Count 4.86 3.80 - 5.20 10e6/uL    Hemoglobin 14.9 11.7 - 15.7 g/dL    Hematocrit 44.2 35.0 - 47.0 %    MCV 91 78 - 100 fL    MCH 30.7 26.5 - 33.0 pg    MCHC 33.7 31.5 - 36.5 g/dL    RDW 12.3 10.0 - 15.0 %    Platelet Count 274 150 - 450 10e3/uL    % Neutrophils 45 %    % Lymphocytes 44 %    % Monocytes 9 %    % Eosinophils 1 %    % Basophils 1 %    % Immature Granulocytes 0 %    NRBCs per 100 WBC 0 <1 /100    Absolute Neutrophils 3.1 1.6 - 8.3 10e3/uL    Absolute Lymphocytes 3.1 0.8 - 5.3 10e3/uL    Absolute Monocytes 0.7 0.0 - 1.3 10e3/uL    Absolute Eosinophils 0.1 0.0 - 0.7 10e3/uL    Absolute Basophils 0.0 0.0 - 0.2 10e3/uL    Absolute Immature Granulocytes 0.0 <=0.4 10e3/uL    Absolute NRBCs 0.0 10e3/uL       If you have any  questions or concerns, please call the clinic at the number listed above.       Sincerely,      Kelin Coronado MD

## 2024-12-12 NOTE — PATIENT INSTRUCTIONS
[facility]  Monday, Wednesday, Friday 9am    Can go to walk indoors at [email or web address].     To help keep bones strong, in addition to routine cardio exercise and weight based exercise, you should get 1000 units Vitamin D daily and 1200 mg of Calcium from diet AND supplement COMBINED. Calcium should be taken in two divided doses, max dose at one time is 600mg. These can be found in women's multivitamins or as an additional supplement. Please check the label to ensure that your vitamin has enough of each and also check what you are eating to determine calcium supplement needed.    Foods and drinks with calcium  Food Calcium in milligrams   Milk (skim, 2%, or whole; 8 oz [240 mL]) 300   Yogurt (6 oz [168 g]) 250   Orange juice (with calcium; 8 oz [240 mL]) 300   Tofu with calcium (0.5 cup [113 g]) 435   Cheese (1 oz [28 g]) 195 to 335 (hard cheese = higher calcium)   Cottage cheese (0.5 cup [113 g]) 130   Ice cream or frozen yogurt (0.5 cup [113 g]) 100   Fortified non-dairy milks (soy, oat, almond; 8 oz [240 mL]) 300 to 450   Beans (0.5 cup cooked [113 g]) 60 to 80   Dark, leafy green vegetables (0.5 cup cooked [113 g]) 50 to 135   Almonds (24 whole) 70   Orange (1 medium) 60      Patient Education   Preventive Care Advice   This is general advice given by our system to help you stay healthy. However, your care team may have specific advice just for you. Please talk to your care team about your preventive care needs.  Nutrition  Eat 5 or more servings of fruits and vegetables each day.  Try wheat bread, brown rice and whole grain pasta (instead of white bread, rice, and pasta).  Get enough calcium and vitamin D. Check the label on foods and aim for 100% of the RDA (recommended daily allowance).  Lifestyle  Exercise at least 150 minutes each week  (30 minutes a day, 5 days a week).  Do muscle strengthening activities 2 days a week. These help control your weight and prevent disease.  No  smoking.  Wear sunscreen to prevent skin cancer.  Have a dental exam and cleaning every 6 months.  Yearly exams  See your health care team every year to talk about:  Any changes in your health.  Any medicines your care team has prescribed.  Preventive care, family planning, and ways to prevent chronic diseases.  Shots (vaccines)   HPV shots (up to age 26), if you've never had them before.  Hepatitis B shots (up to age 59), if you've never had them before.  COVID-19 shot: Get this shot when it's due.  Flu shot: Get a flu shot every year.  Tetanus shot: Get a tetanus shot every 10 years.  Pneumococcal, hepatitis A, and RSV shots: Ask your care team if you need these based on your risk.  Shingles shot (for age 50 and up)  General health tests  Diabetes screening:  Starting at age 35, Get screened for diabetes at least every 3 years.  If you are younger than age 35, ask your care team if you should be screened for diabetes.  Cholesterol test: At age 39, start having a cholesterol test every 5 years, or more often if advised.  Bone density scan (DEXA): At age 50, ask your care team if you should have this scan for osteoporosis (brittle bones).  Hepatitis C: Get tested at least once in your life.  STIs (sexually transmitted infections)  Before age 24: Ask your care team if you should be screened for STIs.  After age 24: Get screened for STIs if you're at risk. You are at risk for STIs (including HIV) if:  You are sexually active with more than one person.  You don't use condoms every time.  You or a partner was diagnosed with a sexually transmitted infection.  If you are at risk for HIV, ask about PrEP medicine to prevent HIV.  Get tested for HIV at least once in your life, whether you are at risk for HIV or not.  Cancer screening tests  Cervical cancer screening: If you have a cervix, begin getting regular cervical cancer screening tests starting at age 21.  Breast cancer scan (mammogram): If you've ever had breasts,  begin having regular mammograms starting at age 40. This is a scan to check for breast cancer.  Colon cancer screening: It is important to start screening for colon cancer at age 45.  Have a colonoscopy test every 10 years (or more often if you're at risk) Or, ask your provider about stool tests like a FIT test every year or Cologuard test every 3 years.  To learn more about your testing options, visit:   .  For help making a decision, visit:   https://bit.ly/yu03809.  Prostate cancer screening test: If you have a prostate, ask your care team if a prostate cancer screening test (PSA) at age 55 is right for you.  Lung cancer screening: If you are a current or former smoker ages 50 to 80, ask your care team if ongoing lung cancer screenings are right for you.  For informational purposes only. Not to replace the advice of your health care provider. Copyright   2023 [employer]. All rights reserved. Clinically reviewed by the [facility]. [employer] [id number] - REV 01/24.  Learning About Stress  What is stress?     Stress is your body's response to a hard situation. Your body can have a physical, emotional, or mental response. Stress is a fact of life for most people, and it affects everyone differently. What causes stress for you may not be stressful for someone else.  A lot of things can cause stress. You may feel stress when you go on a job interview, take a test, or run a race. This kind of short-term stress is normal and even useful. It can help you if you need to work hard or react quickly. For example, stress can help you finish an important job on time.  Long-term stress is caused by ongoing stressful situations or events. Examples of long-term stress include long-term health problems, ongoing problems at work, or conflicts in your family. Long-term stress can harm your health.  How does stress affect your health?  When you are stressed, your body responds as though you are in  danger. It makes hormones that speed up your heart, make you breathe faster, and give you a burst of energy. This is called the fight-or-flight stress response. If the stress is over quickly, your body goes back to normal and no harm is done.  But if stress happens too often or lasts too long, it can have bad effects. Long-term stress can make you more likely to get sick, and it can make symptoms of some diseases worse. If you tense up when you are stressed, you may develop neck, shoulder, or low back pain. Stress is linked to high blood pressure and heart disease.  Stress also harms your emotional health. It can make you fritz, tense, or depressed. Your relationships may suffer, and you may not do well at work or school.  What can you do to manage stress?  You can try these things to help manage stress:   Do something active. Exercise or activity can help reduce stress. Walking is a great way to get started. Even everyday activities such as housecleaning or yard work can help.  Try yoga or prince chi. These techniques combine exercise and meditation. You may need some training at first to learn them.  Do something you enjoy. For example, listen to music or go to a movie. Practice your hobby or do volunteer work.  Meditate. This can help you relax, because you are not worrying about what happened before or what may happen in the future.  Do guided imagery. Imagine yourself in any setting that helps you feel calm. You can use online videos, books, or a teacher to guide you.  Do breathing exercises. For example:  From a standing position, bend forward from the waist with your knees slightly bent. Let your arms dangle close to the floor.  Breathe in slowly and deeply as you return to a standing position. Roll up slowly and lift your head last.  Hold your breath for just a few seconds in the standing position.  Breathe out slowly and bend forward from the waist.  Let your feelings out. Talk, laugh, cry, and express anger  "when you need to. Talking with supportive friends or family, a counselor, or a marquise leader about your feelings is a healthy way to relieve stress. Avoid discussing your feelings with people who make you feel worse.  Write. It may help to write about things that are bothering you. This helps you find out how much stress you feel and what is causing it. When you know this, you can find better ways to cope.  What can you do to prevent stress?  You might try some of these things to help prevent stress:  Manage your time. This helps you find time to do the things you want and need to do.  Get enough sleep. Your body recovers from the stresses of the day while you are sleeping.  Get support. Your family, friends, and community can make a difference in how you experience stress.  Limit your news feed. Avoid or limit time on social media or news that may make you feel stressed.  Do something active. Exercise or activity can help reduce stress. Walking is a great way to get started.  Where can you learn more?  Go to https://www.[employer].net/patiented  Enter N032 in the search box to learn more about \"Learning About Stress.\"  Current as of: October 24, 2023  Content Version: 14.2 2024 [facility] [employer].   Care instructions adapted under license by your healthcare professional. If you have questions about a medical condition or this instruction, always ask your healthcare professional. [employer] disclaims any warranty or liability for your use of this information.    Learning About Depression Screening  What is depression screening?  Depression screening is a way to see if you have depression symptoms. It may be done by a doctor or counselor. It's often part of a routine checkup. That's because your mental health is just as important as your physical health.  Depression is a mental health condition that affects how you feel, think, and act. You may:  Have less energy.  Lose interest in your daily " "activities.  Feel sad and grouchy for a long time.  Depression is very common. It affects people of all ages.  Many things can lead to depression. Some people become depressed after they have a stroke or find out they have a major illness like cancer or heart disease. The death of a loved one or a breakup may lead to depression. It can run in families. Most experts believe that a combination of inherited genes and stressful life events can cause it.  What happens during screening?  You may be asked to fill out a form about your depression symptoms. You and the doctor will discuss your answers. The doctor may ask you more questions to learn more about how you think, act, and feel.  What happens after screening?  If you have symptoms of depression, your doctor will talk to you about your options.  Doctors usually treat depression with medicines or counseling. Often, combining the two works best. Many people don't get help because they think that they'll get over the depression on their own. But people with depression may not get better unless they get treatment.  The cause of depression is not well understood. There may be many factors involved. But if you have depression, it's not your fault.  A serious symptom of depression is thinking about death or suicide. If you or someone you care about talks about this or about feeling hopeless, get help right away.  It's important to know that depression can be treated. Medicine, counseling, and self-care may help.  Where can you learn more?  Go to https://www.[employer].net/patiented  Enter T185 in the search box to learn more about \"Learning About Depression Screening.\"  Current as of: June 24, 2023  Content Version: 14.2 2024 [facility] [employer].   Care instructions adapted under license by your healthcare professional. If you have questions about a medical condition or this instruction, always ask your healthcare professional. [employer] disclaims any " warranty or liability for your use of this information.

## 2024-12-12 NOTE — Clinical Note
RNS - please be sure to monitor about this prior authorization initiated for teriparatide.  It is unclear why it was denied and not improved in October.  The reasoning literally states the name of the medication as an alternative that is covered.  We may need to reach out to her insurance company.

## 2024-12-12 NOTE — PROGRESS NOTES
"Preventive Care Visit  [facility]  Kelin Coronado MD, Family Medicine  Dec 12, 2024      Assessment & Plan     Routine general medical examination at a health care facility  Healthcare Maintenance  - Mammogram: normal 9/20/24  - PAP: Pap neg with neg HPV 3/25/2022 - no history of abnormal   - Colon cancer screening: cologuard negative 11/2023  - DEXA: 10/8/24 with T score -3.5 and compression fracture; forteo ordered but did not get   - Lung cancer screening: n/a  - Lipids: Elevated LDL but low ASCVD score at 3%, annual monitoring    - CBC with Platelets & Differential; Future  - Lipid Profile; Future  - TSH with free T4 reflex; Future    Localized osteoporosis with current pathological fracture with routine healing, subsequent encounter  Very high risk with T-score -3.5 and previous compression fracture.  Unfortunately Forteo was denied but insurance denial says teriparatide is approved, will try sending in generic?  Will have RNs keep an eye on this to ensure she gets.  Strongly advised per previous endocrinology E consult \" Forteo for 2 years followed by denosumab for 2.5 years followed by Reclast yearly for 3 years would be the strongest treatment and sequence.  The drugs shown below might be considered as first line agents, taking into account drug insert prescribing instructions and warnings, insurance limitations and patient preferences.  Any treatment is better than no treatment.  If Forteo is started check labs every 6 months starting 1 month after starting Forteo: calcium, creatinine, uric acid.\"  Once medication is started, we will need to update uric acid, calcium, and creatinine at 1 month and every 6 months going forward.  Planning treatment with teriparatide for 2 years.  If denied, consider abaloparatide. If denied, may need to start prolia.   - CBC with Platelets & Differential; Future  - Comprehensive metabolic panel; Future  - Uric acid; Future  - Vitamin D Deficiency; Future  - " Teriparatide 620 MCG/2.48ML SOPN injection; Inject 0.08 mLs (20 mcg) subcutaneously daily.  - Creatinine; Future  - Uric acid; Future  - Calcium; Future    Poisoning by vitamin D, accidental or unintentional, subsequent encounter  Recheck today.  Level was quite high.  Has been holding.  - Vitamin D Deficiency; Future    Mixed hyperlipidemia  Elevated LDL but low ASCVD score.  Reviewed dietary changes.    Recurrent major depressive disorder, in full remission (H)  Overall stable.  Can wax and wane.  Has difficult job and a lot of stress.  Overall feels she is doing well.  Will continue Wellbutrin.  - buPROPion (WELLBUTRIN XL) 150 MG 24 hr tablet; Take 1 tablet (150 mg) by mouth every morning.    Need for diphtheria-tetanus-pertussis (Tdap) vaccine  - TDAP 10-64Y (ADACEL,BOOSTRIX)      Counseling  Appropriate preventive services were addressed with this patient via screening, questionnaire, or discussion as appropriate for fall prevention, nutrition, physical activity, Tobacco-use cessation, social engagement, weight loss and cognition.  Checklist reviewing preventive services available has been given to the patient.  Reviewed patient's diet, addressing concerns and/or questions.   She is at risk for lack of exercise and has been provided with information to increase physical activity for the benefit of her well-being.   She is at risk for psychosocial distress and has been provided with information to reduce risk.   The patient's PHQ-9 score is consistent with mild depression. She was provided with information regarding depression.         Return in about 1 year (around 12/13/2025) for Preventive Visit.    Anna Marie Montenegro is a 64 year old, presenting for the following:  Physical, Lipids, and Depression        12/12/2024     8:07 AM   Additional Questions   Roomed by Ayanna Braxton   Accompanied by None         12/12/2024     8:07 AM   Patient Reported Additional Medications   Patient reports taking the  following new medications None          HPI  Healthcare Maintenance  - Mammogram: normal 9/20/24  - PAP: Pap neg with neg HPV 3/25/2022 - no history of abnormal   - Colon cancer screening: cologuard negative 11/2023  - DEXA: 10/8/24 with T score -3.5 and compression fracture; eleonorao ordered but did not get   - Lung cancer screening: n/a  - Lipids: due    Hyperlipidemia Follow-Up    Are you regularly taking any medication or supplement to lower your cholesterol?   No  Are you having muscle aches or other side effects that you think could be caused by your cholesterol lowering medication?  No patient is not taking a cholesterol lowering medication     Depression   How are you doing with your depression since your last visit? No change  Are you having other symptoms that might be associated with depression? No  Have you had a significant life event?  No   Are you feeling anxious or having panic attacks?   Yes:     Do you have any concerns with your use of alcohol or other drugs? No    Mood fair. Overwhelmed by life.     Got weight vest. Wearing ankle weights. Going to [place] soon.   Pain related to fracture better. No pain.     Social History     Tobacco Use    Smoking status: Never     Passive exposure: Never    Smokeless tobacco: Never   Vaping Use    Vaping status: Never Used   Substance Use Topics    Alcohol use: Yes     Comment: Moderate - socially at most once a month    Drug use: Never         6/21/2024     1:43 PM 10/11/2024     8:38 AM 12/12/2024     7:47 AM   PHQ   PHQ-9 Total Score 7 6 7    Q9: Thoughts of better off dead/self-harm past 2 weeks Not at all  Not at all  Not at all        Patient-reported         9/13/2023     9:14 AM   BRISEIDA-7 SCORE   Total Score 2 (minimal anxiety)   Total Score 2         12/12/2024     7:47 AM   Last PHQ-9   1.  Little interest or pleasure in doing things 1    2.  Feeling down, depressed, or hopeless 1    3.  Trouble falling or staying asleep, or sleeping too much 1    4.  Feeling  tired or having little energy 1    5.  Poor appetite or overeating 0    6.  Feeling bad about yourself 1    7.  Trouble concentrating 1    8.  Moving slowly or restless 1    Q9: Thoughts of better off dead/self-harm past 2 weeks 0    PHQ-9 Total Score 7        Patient-reported         9/13/2023     9:14 AM   BRISEIDA-7    1. Feeling nervous, anxious, or on edge 1    2. Not being able to stop or control worrying 1    3. Worrying too much about different things 0    4. Trouble relaxing 0    5. Being so restless that it is hard to sit still 0    6. Becoming easily annoyed or irritable 0    7. Feeling afraid, as if something awful might happen 0    BRISEIDA-7 Total Score 2   If you checked any problems, how difficult have they made it for you to do your work, take care of things at home, or get along with other people? Not difficult at all        Patient-reported       Health Care Directive  Patient does not have a Health Care Directive: Discussed advance care planning with patient; however, patient declined at this time.        12/12/2024   General Health   How would you rate your overall physical health? Good   Feel stress (tense, anxious, or unable to sleep) Rather much      (!) STRESS CONCERN      12/12/2024   Nutrition   Three or more servings of calcium each day? Yes   Diet: Carbohydrate counting   How many servings of fruit and vegetables per day? (!) 2-3   How many sweetened beverages each day? 0-1            12/12/2024   Exercise   Days per week of moderate/strenous exercise 2 days   Average minutes spent exercising at this level 40 min      (!) EXERCISE CONCERN      12/12/2024   Social Factors   Frequency of gathering with friends or relatives Once a week   Worry food won't last until get money to buy more No   Food not last or not have enough money for food? No   Do you have housing? (Housing is defined as stable permanent housing and does not include staying ouside in a car, in a tent, in an abandoned building, in an  overnight shelter, or couch-surfing.) Yes   Are you worried about losing your housing? No   Lack of transportation? No   Unable to get utilities (heat,electricity)? No            12/12/2024   Fall Risk   Fallen 2 or more times in the past year? No     No    Trouble with walking or balance? No     No        Patient-reported    Multiple values from one day are sorted in reverse-chronological order          12/12/2024   Dental   Dentist two times every year? Yes             Today's PHQ-9 Score:       12/12/2024     7:47 AM   PHQ-9 SCORE   PHQ-9 Total Score MyChart 7 (Mild depression)   PHQ-9 Total Score 7        Patient-reported         12/12/2024   Substance Use   Alcohol more than 3/day or more than 7/wk No   Do you use any other substances recreationally? No        Social History     Tobacco Use    Smoking status: Never     Passive exposure: Never    Smokeless tobacco: Never   Vaping Use    Vaping status: Never Used   Substance Use Topics    Alcohol use: Yes     Comment: Moderate - socially at most once a month    Drug use: Never         9/20/2024   LAST FHS-7 RESULTS   1st degree relative breast or ovarian cancer No   Any relative bilateral breast cancer No   Any male have breast cancer No   Any ONE woman have BOTH breast AND ovarian cancer No   Any woman with breast cancer before 50yrs No   2 or more relatives with breast AND/OR ovarian cancer No   2 or more relatives with breast AND/OR bowel cancer No        Mammogram Screening - Mammogram every 1-2 years updated in Health Maintenance based on mutual decision making        12/12/2024   STI Screening   New sexual partner(s) since last STI/HIV test? No        History of abnormal Pap smear: No - age 30- 64 PAP with HPV every 5 years recommended        3/25/2022     9:50 AM   PAP / HPV   PAP-ABSTRACT See Scanned Document           This result is from an external source.     ASCVD Risk   The 10-year ASCVD risk score (Ranjana DK, et al., 2019) is: 3.2%     "Values used to calculate the score:      Age: 64 years      Sex: Female      Is Non- : No      Diabetic: No      Tobacco smoker: No      Systolic Blood Pressure: 109 mmHg      Is BP treated: No      HDL Cholesterol: 63 mg/dL      Total Cholesterol: 215 mg/dL      Reviewed and updated as needed this visit by Provider   Tobacco   Meds   Med Hx  Surg Hx  Fam Hx  Soc Hx Sexual Activity                 Objective    Exam  /75 (BP Location: Right arm, Patient Position: Sitting, Cuff Size: Adult Regular)   Pulse 84   Temp 97.9  F (36.6  C) (Tympanic)   Resp 16   Ht 1.499 m (4' 11\")   Wt 55 kg (121 lb 5 oz)   SpO2 100%   BMI 24.50 kg/m     Estimated body mass index is 24.5 kg/m  as calculated from the following:    Height as of this encounter: 1.499 m (4' 11\").    Weight as of this encounter: 55 kg (121 lb 5 oz).    Physical Exam  Constitutional:       General: She is not in acute distress.     Appearance: Normal appearance. She is well-developed. She is not ill-appearing.   HENT:      Head: Normocephalic and atraumatic.      Right Ear: Tympanic membrane and external ear normal.      Left Ear: Tympanic membrane and external ear normal.      Nose: Nose normal.      Mouth/Throat:      Mouth: Mucous membranes are moist.      Pharynx: No oropharyngeal exudate.   Eyes:      Extraocular Movements: Extraocular movements intact.      Conjunctiva/sclera: Conjunctivae normal.   Neck:      Thyroid: No thyroid mass, thyromegaly or thyroid tenderness.   Cardiovascular:      Rate and Rhythm: Normal rate and regular rhythm.      Pulses: Normal pulses.      Heart sounds: Normal heart sounds, S1 normal and S2 normal. No murmur heard.  Pulmonary:      Effort: Pulmonary effort is normal. No respiratory distress.      Breath sounds: Normal breath sounds. No wheezing, rhonchi or rales.   Chest:   Breasts:     Right: Normal. No inverted nipple, mass, skin change or tenderness.      Left: Normal. No " inverted nipple, mass, skin change or tenderness.   Abdominal:      General: Bowel sounds are normal. There is no abdominal bruit.      Palpations: Abdomen is soft. There is no mass or pulsatile mass.      Tenderness: There is no abdominal tenderness.   Musculoskeletal:         General: Normal range of motion.      Cervical back: Neck supple.      Right lower leg: No edema.      Left lower leg: No edema.   Lymphadenopathy:      Cervical: No cervical adenopathy.      Upper Body:      Right upper body: No supraclavicular or axillary adenopathy.      Left upper body: No supraclavicular or axillary adenopathy.   Skin:     General: Skin is warm and dry.      Capillary Refill: Capillary refill takes less than 2 seconds.      Findings: No rash.   Neurological:      Mental Status: She is alert and oriented to person, place, and time.      Gait: Gait is intact.   Psychiatric:         Attention and Perception: Attention normal.         Mood and Affect: Mood normal.         Speech: Speech normal.         Behavior: Behavior normal.         Thought Content: Thought content normal.         Cognition and Memory: Cognition normal.         Judgment: Judgment normal.             Results for orders placed or performed in visit on 12/12/24   Comprehensive metabolic panel     Status: None   Result Value Ref Range    Sodium 141 135 - 145 mmol/L    Potassium 4.2 3.4 - 5.3 mmol/L    Carbon Dioxide (CO2) 27 22 - 29 mmol/L    Anion Gap 11 7 - 15 mmol/L    Urea Nitrogen 22.3 8.0 - 23.0 mg/dL    Creatinine 0.73 0.51 - 0.95 mg/dL    GFR Estimate >90 >60 mL/min/1.73m2    Calcium 9.8 8.8 - 10.4 mg/dL    Chloride 103 98 - 107 mmol/L    Glucose 98 70 - 99 mg/dL    Alkaline Phosphatase 65 40 - 150 U/L    AST 19 0 - 45 U/L    ALT 18 0 - 50 U/L    Protein Total 7.9 6.4 - 8.3 g/dL    Albumin 4.6 3.5 - 5.2 g/dL    Bilirubin Total 0.5 <=1.2 mg/dL    Patient Fasting > 8hrs? No    Lipid Profile     Status: Abnormal   Result Value Ref Range    Cholesterol  229 (H) <200 mg/dL    Triglycerides 73 <150 mg/dL    Direct Measure HDL 80 >=50 mg/dL    LDL Cholesterol Calculated 134 (H) <100 mg/dL    Non HDL Cholesterol 149 (H) <130 mg/dL    Patient Fasting > 8hrs? No     Narrative    Cholesterol  Desirable: < 200 mg/dL  Borderline High: 200 - 239 mg/dL  High: >= 240 mg/dL    Triglycerides  Normal: < 150 mg/dL  Borderline High: 150 - 199 mg/dL  High: 200-499 mg/dL  Very High: >= 500 mg/dL    Direct Measure HDL  Female: >= 50 mg/dL   Male: >= 40 mg/dL    LDL Cholesterol  Desirable: < 100 mg/dL  Above Desirable: 100 - 129 mg/dL   Borderline High: 130 - 159 mg/dL   High:  160 - 189 mg/dL   Very High: >= 190 mg/dL    Non HDL Cholesterol  Desirable: < 130 mg/dL  Above Desirable: 130 - 159 mg/dL  Borderline High: 160 - 189 mg/dL  High: 190 - 219 mg/dL  Very High: >= 220 mg/dL   TSH with free T4 reflex     Status: Normal   Result Value Ref Range    TSH 3.15 0.30 - 4.20 uIU/mL   Uric acid     Status: Normal   Result Value Ref Range    Uric Acid 3.2 2.4 - 5.7 mg/dL   CBC with platelets and differential     Status: None   Result Value Ref Range    WBC Count 6.9 4.0 - 11.0 10e3/uL    RBC Count 4.86 3.80 - 5.20 10e6/uL    Hemoglobin 14.9 11.7 - 15.7 g/dL    Hematocrit 44.2 35.0 - 47.0 %    MCV 91 78 - 100 fL    MCH 30.7 26.5 - 33.0 pg    MCHC 33.7 31.5 - 36.5 g/dL    RDW 12.3 10.0 - 15.0 %    Platelet Count 274 150 - 450 10e3/uL    % Neutrophils 45 %    % Lymphocytes 44 %    % Monocytes 9 %    % Eosinophils 1 %    % Basophils 1 %    % Immature Granulocytes 0 %    NRBCs per 100 WBC 0 <1 /100    Absolute Neutrophils 3.1 1.6 - 8.3 10e3/uL    Absolute Lymphocytes 3.1 0.8 - 5.3 10e3/uL    Absolute Monocytes 0.7 0.0 - 1.3 10e3/uL    Absolute Eosinophils 0.1 0.0 - 0.7 10e3/uL    Absolute Basophils 0.0 0.0 - 0.2 10e3/uL    Absolute Immature Granulocytes 0.0 <=0.4 10e3/uL    Absolute NRBCs 0.0 10e3/uL   CBC with Platelets & Differential     Status: None    Narrative    The following orders were  created for panel order CBC with Platelets & Differential.  Procedure                               Abnormality         Status                     ---------                               -----------         ------                     CBC with platelets and d...[[id number]]                      Final result                 Please view results for these tests on the individual orders.           Signed Electronically by: Kelin Coronado MD    Answers submitted by the patient for this visit:  Patient Health Questionnaire (Submitted on 12/12/2024)  If you checked off any problems, how difficult have these problems made it for you to do your work, take care of things at home, or get along with other people?: Somewhat difficult  PHQ9 TOTAL SCORE: 7

## 2024-12-13 NOTE — RESULT ENCOUNTER NOTE
Please call patient and have her double check her supplements.  Check if there is vitamin D in a multivitamin.  Clarify how much milk, cheese, cottage cheese, or yogurt she is eating. Make sure she checks her home supplements and that she is not still getting vitamin D.

## 2024-12-16 ENCOUNTER — TELEPHONE (OUTPATIENT)
Dept: CARE COORDINATION | Facility: OTHER | Age: 64
End: 2024-12-16

## 2024-12-16 NOTE — TELEPHONE ENCOUNTER
RN CC talked with [facility] regarding Teriparatide 620 MCG/2.48ML SOPN injection.  Medication is ready to be shipped to patient, but pharmacy is needing further information from patient.   RN CC LM for patient to return call to CT2 nurse line and will provide patient with specialty pharmacy phone number, [phone], with call back.

## 2024-12-18 ENCOUNTER — MYC MEDICAL ADVICE (OUTPATIENT)
Dept: FAMILY MEDICINE | Facility: OTHER | Age: 64
End: 2024-12-18

## 2024-12-19 ENCOUNTER — TELEPHONE (OUTPATIENT)
Dept: ENDOCRINOLOGY | Facility: CLINIC | Age: 64
End: 2024-12-19
Payer: COMMERCIAL

## 2024-12-23 ENCOUNTER — TELEPHONE (OUTPATIENT)
Dept: FAMILY MEDICINE | Facility: OTHER | Age: 64
End: 2024-12-23

## 2024-12-23 NOTE — TELEPHONE ENCOUNTER
[place]. I saw there was a MyChart message in her chart regarding vitamin D levels and how much cheese, cottage cheese and yogurt she is eating- patient stated she eats a lot of cheese and uses creamer/ half and half in coffee. Patient stated she will look at multi vitamin to see if it has any vitamin D and send a MyChart back with response.

## 2025-01-06 ENCOUNTER — TELEPHONE (OUTPATIENT)
Dept: FAMILY MEDICINE | Facility: OTHER | Age: 65
End: 2025-01-06

## 2025-01-06 DIAGNOSIS — T45.2X1D POISONING BY VITAMIN D, ACCIDENTAL OR UNINTENTIONAL, SUBSEQUENT ENCOUNTER: Primary | ICD-10-CM

## 2025-01-06 NOTE — TELEPHONE ENCOUNTER
Kelin Coronado MD  P  Family Care Team 2  Please call her again to clarify how much vit D she is getting at home. I have not heard back from her. How much is in her multi? Make sure she stopped all supplements.    Attempted to call patientNEGIN for her to call back

## 2025-01-08 NOTE — TELEPHONE ENCOUNTER
Patient stated she is not taking any vitamin D at home. Her multivitamin is 2000 international unit(s) a day- but she is not taking this right now she is taking a vitamin B and C. Informed patient to stop all supplements as well patient stated that she was told to keep taking calcium because of osteoporosis, so she is wondering if she should stop calcium or continue it. Please advise.

## 2025-01-08 NOTE — TELEPHONE ENCOUNTER
Should stop the multivitamin. Should get calcium 1200mg daily from diet and supplement combined. Can get separate calcium supplement at the store. Please schedule recheck Vit D in one month.   Also when did she stop the multivitamin and high dose vitamin D?

## 2025-01-14 ENCOUNTER — TELEPHONE (OUTPATIENT)
Dept: FAMILY MEDICINE | Facility: OTHER | Age: 65
End: 2025-01-14

## 2025-01-14 NOTE — TELEPHONE ENCOUNTER
Received PA request from [facility] for Teriparatide 620 MCG/2.48ML SOPN injection. Submitted on CMM, waiting for response.

## 2025-01-19 NOTE — TELEPHONE ENCOUNTER
----- Message from Zenaida Manzano sent at 12/19/2024  9:15 PM CST -----  It looks like she hasn't held the vitamin D.   The original econsult didn't indicate the vitamin D dose she was taking (It wasn't on the med list).   I found a  10/14/24 note  today that says she had been taking 215784 units/day for some time.  Is this correct?  Did she stop it? When?  What is she taking now?  Is she taking other drugs containing vitamin D?  Such as calcium, vitamins?  ----- Message -----  From: Kelin Coronado MD  Sent: 12/19/2024   8:07 PM CST  To: Zenaida Manzano MD    Hi Dr Manzano,     You did an E-consult on this patient for me in October. I am wondering if I should be more worried regarding her vit D not coming down much over two months. It was 219 in October and now is 210 after two months. You mentioned to hold it for minimum two months and that the half life was two weeks. I'd appreciate your input - can discuss another consult with patient too if needed.     Thanks!    Kelin Coronado

## 2025-01-20 ENCOUNTER — TELEPHONE (OUTPATIENT)
Dept: FAMILY MEDICINE | Facility: OTHER | Age: 65
End: 2025-01-20

## 2025-01-20 NOTE — TELEPHONE ENCOUNTER
11:44 AM    Reason for Call: Phone Call    Description: Patient called to report that her prescription was denied because Post menopausal needs to be checked. She is requesting that the provider makes sure that Post Menopausal is listed as reason for medication, Teriparatide 620      Was an appointment offered for this call? No  If yes : Appointment type              Date    Preferred method for responding to this message: Telephone Call  What is your phone number ? [phone]     If we cannot reach you directly, may we leave a detailed response at the number you provided? Yes    Can this message wait until your PCP/provider returns, if available today? Call with questions    Jasmyn Collazo

## 2025-01-21 NOTE — TELEPHONE ENCOUNTER
Was able to get a hold of patient this morning. Patient stated she can't remember when she stopped the multi vitamin but vitamin D was stopped whenever she was told to stop by you.  She also stated that she is taking calcium citrate 300 mg. She is taking 300 mg in the morning and 300 mg at night.  Eating meat dairy and veggies. She will monitor what she is getting daily of calcium from meals.

## 2025-01-21 NOTE — TELEPHONE ENCOUNTER
Spoke with patient she stated that the prior authorization has a box regarding if patient is post menopausal or not and that box needs to be checked that she is post menopausal so that she can get medication. Patient stated that the medication is not approved for non menopausal patients, she is post menopause so checking this will allow her to get medication.

## 2025-01-22 NOTE — TELEPHONE ENCOUNTER
Received an APPROVAL from [employer] for Teriparatide 620 MCG/2.48ML SOPN injection. Approved through 1/21/2027.

## 2025-01-24 NOTE — TELEPHONE ENCOUNTER
Kelin Coronado MD  P  Family Care Team 2  Please let patient know the medication was approved.  Will need lab check 1 month from starting the medication.    Pt called to update regarding message above and no answer.  Left message to call back.

## 2025-01-24 NOTE — TELEPHONE ENCOUNTER
Pt called and updated regarding message from PCP below. Pt got the teriparatide in the mail and will be starting that today.   Pt has a lab appointment scheduled for 02/21/2025 and will do the labs recommended after starting the teriparatide.

## 2025-02-12 DIAGNOSIS — M80.80XD LOCALIZED OSTEOPOROSIS WITH CURRENT PATHOLOGICAL FRACTURE WITH ROUTINE HEALING, SUBSEQUENT ENCOUNTER: ICD-10-CM

## 2025-02-12 RX ORDER — TERIPARATIDE 250 UG/ML
INJECTION, SOLUTION SUBCUTANEOUS
Qty: 2.48 ML | Refills: 3 | Status: SHIPPED | OUTPATIENT
Start: 2025-02-12

## 2025-02-12 NOTE — TELEPHONE ENCOUNTER
Teriparatide      Last Written Prescription Date:  12/12/24  Last Fill Quantity: 2.48mL,   # refills: 0  Last Office Visit: 12/12/24  Future Office visit:       Routing refill request to provider for review/approval because:

## 2025-02-21 ENCOUNTER — LAB (OUTPATIENT)
Dept: LAB | Facility: OTHER | Age: 65
End: 2025-02-21
Payer: COMMERCIAL

## 2025-02-21 DIAGNOSIS — M80.80XD LOCALIZED OSTEOPOROSIS WITH CURRENT PATHOLOGICAL FRACTURE WITH ROUTINE HEALING, SUBSEQUENT ENCOUNTER: ICD-10-CM

## 2025-02-21 DIAGNOSIS — T45.2X1D POISONING BY VITAMIN D, ACCIDENTAL OR UNINTENTIONAL, SUBSEQUENT ENCOUNTER: ICD-10-CM

## 2025-02-21 LAB
CALCIUM SERPL-MCNC: 9.1 MG/DL (ref 8.8–10.4)
CREAT SERPL-MCNC: 0.72 MG/DL (ref 0.51–0.95)
EGFRCR SERPLBLD CKD-EPI 2021: >90 ML/MIN/1.73M2
URATE SERPL-MCNC: 3.9 MG/DL (ref 2.4–5.7)
VIT D+METAB SERPL-MCNC: 115 NG/ML (ref 20–50)

## 2025-05-26 DIAGNOSIS — M80.80XD LOCALIZED OSTEOPOROSIS WITH CURRENT PATHOLOGICAL FRACTURE WITH ROUTINE HEALING, SUBSEQUENT ENCOUNTER: ICD-10-CM

## 2025-05-28 NOTE — TELEPHONE ENCOUNTER
Teriparatide 620 MCG/2.48ML SOPN injection 2.48 mL 3 2/12/2025   Last Office Visit: 12/12/24     Future Office visit:       Routing refill request to provider for review/approval because:

## 2025-05-29 RX ORDER — TERIPARATIDE 250 UG/ML
INJECTION, SOLUTION SUBCUTANEOUS
Qty: 2.48 ML | Refills: 3 | Status: SHIPPED | OUTPATIENT
Start: 2025-05-29